# Patient Record
Sex: FEMALE | NOT HISPANIC OR LATINO | Employment: OTHER | URBAN - METROPOLITAN AREA
[De-identification: names, ages, dates, MRNs, and addresses within clinical notes are randomized per-mention and may not be internally consistent; named-entity substitution may affect disease eponyms.]

---

## 2018-06-22 ENCOUNTER — TELEPHONE (OUTPATIENT)
Dept: HEMATOLOGY/ONCOLOGY | Facility: CLINIC | Age: 68
End: 2018-06-22

## 2018-06-22 NOTE — TELEPHONE ENCOUNTER
From: Sri Junior   Sent: Friday, June 22, 2018 4:42 PM  To: '[email or web address]' <[email or web address]>  Subject: [facility]    It was good talking to you, Ms. Mcmanus!  As promised, Im sending you  our Release of Information so we can request medical records.  Please sign it and fax or mail back to me (see below contact info).    Heres what I am looking for:  - Cancer pathology/ biopsy reports  - Scans like PET scan, CT scan, MRI, ultrasound  - Treatment data (chemo, surgery report)  - Doctors notes or progress report (both cancer & anemia)    Do not hesitate to contact me.  -  Sri Junior, RN   Oncology Nurse Navigator  [place] [address]  [address]  [email or web address]  [phone] (phone)  [phone] (fax)      ===============================================================  Received referral to Hematologist for refractory anemia.  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  Was dx'd with adenocarcinoma of lung s/p chemo & lobectomy 2016.  Referral made for pulmo but not onco.  Labs 6/21/18 rbc= 3.72 and  H/h 11.7 / 34.9    Left [facility] with pt to return call.  Will ask about records.    ========================================  ----- Message from Leora Dawn sent at 6/22/2018  9:05 AM CDT -----  Regarding: Ext Referral  Radha Angela from [facility] is referring pt to Hem Onc for Anemia. Referral and records are in . Please review and call pt to schedule consult. Thank you!

## 2018-07-19 ENCOUNTER — OFFICE VISIT (OUTPATIENT)
Dept: NEPHROLOGY | Facility: CLINIC | Age: 68
End: 2018-07-19
Payer: MEDICARE

## 2018-07-19 ENCOUNTER — INFUSION (OUTPATIENT)
Dept: INFUSION THERAPY | Facility: HOSPITAL | Age: 68
End: 2018-07-19
Attending: INTERNAL MEDICINE
Payer: MEDICARE

## 2018-07-19 VITALS
OXYGEN SATURATION: 98 % | SYSTOLIC BLOOD PRESSURE: 140 MMHG | WEIGHT: 154 LBS | BODY MASS INDEX: 26.29 KG/M2 | DIASTOLIC BLOOD PRESSURE: 80 MMHG | HEART RATE: 82 BPM | HEIGHT: 64 IN

## 2018-07-19 DIAGNOSIS — N18.30 CKD (CHRONIC KIDNEY DISEASE) STAGE 3, GFR 30-59 ML/MIN: ICD-10-CM

## 2018-07-19 DIAGNOSIS — R94.4 RENAL FUNCTION TEST ABNORMAL: ICD-10-CM

## 2018-07-19 DIAGNOSIS — C34.11 MALIGNANT NEOPLASM OF UPPER LOBE OF RIGHT LUNG: ICD-10-CM

## 2018-07-19 PROBLEM — F32.A DEPRESSION: Status: ACTIVE | Noted: 2018-07-19

## 2018-07-19 LAB
ALBUMIN SERPL BCP-MCNC: 4.1 G/DL
ANION GAP SERPL CALC-SCNC: 9 MMOL/L
BASOPHILS # BLD AUTO: 0.06 K/UL
BASOPHILS NFR BLD: 1.1 %
BUN SERPL-MCNC: 20 MG/DL
CALCIUM SERPL-MCNC: 9.5 MG/DL
CHLORIDE SERPL-SCNC: 107 MMOL/L
CO2 SERPL-SCNC: 25 MMOL/L
CREAT SERPL-MCNC: 1.1 MG/DL
DIFFERENTIAL METHOD: ABNORMAL
EOSINOPHIL # BLD AUTO: 0.1 K/UL
EOSINOPHIL NFR BLD: 2.5 %
ERYTHROCYTE [DISTWIDTH] IN BLOOD BY AUTOMATED COUNT: 12.3 %
EST. GFR  (AFRICAN AMERICAN): >60 ML/MIN/1.73 M^2
EST. GFR  (NON AFRICAN AMERICAN): 52.1 ML/MIN/1.73 M^2
FERRITIN SERPL-MCNC: 41 NG/ML
GLUCOSE SERPL-MCNC: 83 MG/DL
HCT VFR BLD AUTO: 31.6 %
HGB BLD-MCNC: 10.2 G/DL
IMM GRANULOCYTES # BLD AUTO: 0.01 K/UL
IMM GRANULOCYTES NFR BLD AUTO: 0.2 %
IRON SERPL-MCNC: 79 UG/DL
LYMPHOCYTES # BLD AUTO: 1.8 K/UL
LYMPHOCYTES NFR BLD: 31.5 %
MCH RBC QN AUTO: 31.2 PG
MCHC RBC AUTO-ENTMCNC: 32.3 G/DL
MCV RBC AUTO: 97 FL
MONOCYTES # BLD AUTO: 0.4 K/UL
MONOCYTES NFR BLD: 6.3 %
NEUTROPHILS # BLD AUTO: 3.3 K/UL
NEUTROPHILS NFR BLD: 58.4 %
NRBC BLD-RTO: 0 /100 WBC
PHOSPHATE SERPL-MCNC: 4.4 MG/DL
PLATELET # BLD AUTO: 178 K/UL
PMV BLD AUTO: 8.9 FL
POTASSIUM SERPL-SCNC: 4.2 MMOL/L
RBC # BLD AUTO: 3.27 M/UL
SATURATED IRON: 23 %
SODIUM SERPL-SCNC: 141 MMOL/L
TOTAL IRON BINDING CAPACITY: 340 UG/DL
TRANSFERRIN SERPL-MCNC: 230 MG/DL
WBC # BLD AUTO: 5.56 K/UL

## 2018-07-19 PROCEDURE — 83540 ASSAY OF IRON: CPT

## 2018-07-19 PROCEDURE — 25000003 PHARM REV CODE 250: Performed by: INTERNAL MEDICINE

## 2018-07-19 PROCEDURE — A4216 STERILE WATER/SALINE, 10 ML: HCPCS | Performed by: INTERNAL MEDICINE

## 2018-07-19 PROCEDURE — 99203 OFFICE O/P NEW LOW 30 MIN: CPT | Mod: S$GLB,,, | Performed by: INTERNAL MEDICINE

## 2018-07-19 PROCEDURE — 85025 COMPLETE CBC W/AUTO DIFF WBC: CPT

## 2018-07-19 PROCEDURE — 36591 DRAW BLOOD OFF VENOUS DEVICE: CPT

## 2018-07-19 PROCEDURE — 99999 PR PBB SHADOW E&M-EST. PATIENT-LVL III: CPT | Mod: PBBFAC,,, | Performed by: INTERNAL MEDICINE

## 2018-07-19 PROCEDURE — 36415 COLL VENOUS BLD VENIPUNCTURE: CPT

## 2018-07-19 PROCEDURE — 82728 ASSAY OF FERRITIN: CPT

## 2018-07-19 PROCEDURE — 80069 RENAL FUNCTION PANEL: CPT

## 2018-07-19 PROCEDURE — 63600175 PHARM REV CODE 636 W HCPCS: Performed by: INTERNAL MEDICINE

## 2018-07-19 RX ORDER — SODIUM CHLORIDE 0.9 % (FLUSH) 0.9 %
10 SYRINGE (ML) INJECTION
Status: DISCONTINUED | OUTPATIENT
Start: 2018-07-19 | End: 2018-07-19 | Stop reason: HOSPADM

## 2018-07-19 RX ORDER — NORTRIPTYLINE HYDROCHLORIDE 50 MG/1
50 CAPSULE ORAL NIGHTLY
COMMUNITY
End: 2019-01-14

## 2018-07-19 RX ORDER — DULOXETIN HYDROCHLORIDE 30 MG/1
30 CAPSULE, DELAYED RELEASE ORAL DAILY
Refills: 2 | COMMUNITY
Start: 2018-06-23 | End: 2019-01-14 | Stop reason: SDUPTHER

## 2018-07-19 RX ORDER — HEPARIN 100 UNIT/ML
500 SYRINGE INTRAVENOUS
Status: COMPLETED | OUTPATIENT
Start: 2018-07-19 | End: 2018-07-19

## 2018-07-19 RX ADMIN — HEPARIN 500 UNITS: 100 SYRINGE at 04:07

## 2018-07-19 RX ADMIN — SODIUM CHLORIDE, PRESERVATIVE FREE 10 ML: 5 INJECTION INTRAVENOUS at 04:07

## 2018-07-19 NOTE — PROGRESS NOTES
Subjective:       Patient ID: Marietta Rosales is a 68 y.o. Unknown female who presents for new evaluation of renal funciton  HPI    and Progress note from which where [id number] yo wf with osteoarthritis, depression, IBS, r upper lobectomy for adneocarcinoma of R lung last treated 8 months ago in [place] with L subclavian prot that has not been flushed for a prolong time, anemia that had been treated with procrit in past. Patient does not know more about her medical history.There is a single document  With labs from 6/2018 in media with serum crt 1.24 gfr 53 normal electrolytesation is taken  The patient has no symptoms of fatigue, shortness of breath, chest pain, peripheral edema, flank pain, dysuria, hematuria, foamy urine, orange colored urine, nephrolithiasis,  diarrhea, constipation, lower extremity leg cramping, headache, nausea and vomiting, or weakness.    SH:former smoker  FH: noncontributory    Review of Systems   Constitutional: Negative for appetite change, chills, fatigue, fever and unexpected weight change.   HENT: Negative for congestion, facial swelling, hearing loss, nosebleeds and trouble swallowing.    Eyes: Negative for pain, discharge, redness and visual disturbance.   Respiratory: Negative for cough, chest tightness, shortness of breath and wheezing.    Cardiovascular: Negative for chest pain, palpitations and leg swelling.   Gastrointestinal: Negative for abdominal pain, constipation, diarrhea, nausea and vomiting.   Endocrine: Negative for cold intolerance, heat intolerance and polydipsia.   Genitourinary: Negative for decreased urine volume, difficulty urinating, dysuria, flank pain, hematuria and urgency.   Musculoskeletal: Negative for arthralgias, back pain, joint swelling and myalgias.   Skin: Negative for color change, pallor, rash and wound.   Neurological: Negative for dizziness, tremors, seizures, syncope, speech difficulty, weakness and headaches.   Hematological: Negative for  "adenopathy. Does not bruise/bleed easily.   Psychiatric/Behavioral: Negative for agitation, behavioral problems, dysphoric mood, self-injury and sleep disturbance.       Objective:     Blood pressure (!) 140/80, pulse 82, height 5' 4" (1.626 m), weight 69.9 kg (154 lb), SpO2 98 %.    Physical Exam   Constitutional: She is oriented to person, place, and time. She appears well-developed and well-nourished. No distress.   HENT:   Head: Normocephalic and atraumatic.   Mouth/Throat: No oropharyngeal exudate.   Eyes: Conjunctivae and EOM are normal. Pupils are equal, round, and reactive to light. Right eye exhibits no discharge. Left eye exhibits no discharge. No scleral icterus.   Neck: Normal range of motion. Neck supple. No JVD present. No tracheal deviation present. No thyromegaly present.   Cardiovascular: Normal rate, regular rhythm, normal heart sounds and intact distal pulses.  Exam reveals no gallop.    No murmur heard.  No periheral edema , dp pulses not well palpated   Pulmonary/Chest: Effort normal and breath sounds normal. No stridor. No respiratory distress. She has no wheezes. She has no rales. She exhibits no tenderness.   l subclavian port area clean dry no erythema   Abdominal: Soft. Bowel sounds are normal. She exhibits no distension and no mass. There is no tenderness. There is no rebound and no guarding. No hernia.   Musculoskeletal: She exhibits no edema or tenderness.   Lymphadenopathy:     She has no cervical adenopathy.   Neurological: She is alert and oriented to person, place, and time. She exhibits normal muscle tone.   Skin: Skin is warm and dry. No rash noted. She is not diaphoretic. No erythema.   Psychiatric: She has a normal mood and affect. Judgment and thought content normal.   Nursing note and vitals reviewed.      Assessment:       1. Renal function test abnormal    2. Malignant neoplasm of upper lobe of right lung        Plan:     69 yo wf with osteoarthritis, depression, IBS, r upper " lobectomy for adneocarcinoma of R lung last treated 8 months ago in [place] with L subclavian prot that has not been flushed for a prolong time, anemia that had been treated with procrit in past. Patient does not know more about her medical history.There is a single document  With labs from 6/2018 in media with serum crt 1.24 gfr 53 normal electrolytes    Probable ckd 3 etiology unclear at this time.   Obtain labs  for confirmation then consider further imaging and serologies  and urine studies as indicated.   RTC  In 3-4 months    Attempt to have port flushed at infusion center  Hem/on and pulmonary referrals in place as well.

## 2018-07-19 NOTE — PATIENT INSTRUCTIONS
Labs today     port flush asap     hematology /oncology referral asap    Reschedule nephrology visit  With any nephrology opening in September

## 2018-07-19 NOTE — NURSING
Pt arrived for labs from port from [facility].  Port flushes easily with good blood return, labs sent.  Port de accessed.  Pt discharged to home with armani

## 2018-07-19 NOTE — LETTER
July 19, 2018      NICOLE Villarreal  [address] [facility] [place] [address]           [facility] - Nephrology  [address]  [facility] [place] [address]  Phone: [phone]  Fax: [phone]          Patient: Marietta Rosales   MR Number: [id number]   YOB: 1950   Date of Visit: 7/19/2018       Dear Radha Angela:    Thank you for referring Marietta Rosales to me for evaluation. Attached you will find relevant portions of my assessment and plan of care.    If you have questions, please do not hesitate to call me. I look forward to following Marietta Rosales along with you.    Sincerely,    Radha De Leon MD    Enclosure  CC:  No Recipients    If you would like to receive this communication electronically, please contact [email or web address] or [phone] to request more information on [employer] Link access.    For providers and/or their staff who would like to refer a patient to [facility], please contact us through our one-stop-shop provider referral line, [facility], at [phone].    If you feel you have received this communication in error or would no longer like to receive these types of communications, please e-mail [email or web address]

## 2018-07-20 RX ORDER — SODIUM CHLORIDE 0.9 % (FLUSH) 0.9 %
10 SYRINGE (ML) INJECTION
Status: CANCELLED | OUTPATIENT
Start: 2018-07-20

## 2018-07-20 RX ORDER — HEPARIN 100 UNIT/ML
500 SYRINGE INTRAVENOUS
Status: CANCELLED | OUTPATIENT
Start: 2018-07-20

## 2018-08-15 DIAGNOSIS — C34.90 MALIGNANT NEOPLASM OF LUNG, UNSPECIFIED LATERALITY, UNSPECIFIED PART OF LUNG: Primary | ICD-10-CM

## 2018-08-16 ENCOUNTER — OFFICE VISIT (OUTPATIENT)
Dept: PULMONOLOGY | Facility: CLINIC | Age: 68
End: 2018-08-16
Payer: MEDICARE

## 2018-08-16 ENCOUNTER — HOSPITAL ENCOUNTER (OUTPATIENT)
Dept: RADIOLOGY | Facility: HOSPITAL | Age: 68
Discharge: HOME | End: 2018-08-16
Attending: INTERNAL MEDICINE
Payer: MEDICARE

## 2018-08-16 VITALS
HEIGHT: 64 IN | SYSTOLIC BLOOD PRESSURE: 118 MMHG | OXYGEN SATURATION: 98 % | WEIGHT: 158.06 LBS | DIASTOLIC BLOOD PRESSURE: 66 MMHG | BODY MASS INDEX: 26.98 KG/M2 | HEART RATE: 75 BPM

## 2018-08-16 DIAGNOSIS — C34.90 MALIGNANT NEOPLASM OF LUNG, UNSPECIFIED LATERALITY, UNSPECIFIED PART OF LUNG: ICD-10-CM

## 2018-08-16 DIAGNOSIS — C34.91 BRONCHOGENIC CARCINOMA OF RIGHT LUNG: Primary | ICD-10-CM

## 2018-08-16 PROCEDURE — 99999 PR PBB SHADOW E&M-EST. PATIENT-LVL III: CPT | Mod: PBBFAC,,, | Performed by: INTERNAL MEDICINE

## 2018-08-16 PROCEDURE — 71046 X-RAY EXAM CHEST 2 VIEWS: CPT | Mod: TC,FY

## 2018-08-16 PROCEDURE — 71046 X-RAY EXAM CHEST 2 VIEWS: CPT | Mod: 26,,, | Performed by: RADIOLOGY

## 2018-08-18 NOTE — PROGRESS NOTES
Subjective:       Patient ID: Marietta Rosales is a 68 y.o. female.    Chief Complaint: Cancer (lung) and Shortness of Breath    HPI      No flowsheet data found.]  Review of Systems    Objective:      Physical Exam        Assessment:       No diagnosis found.    Outpatient Encounter Medications as of 8/16/2018   Medication Sig Dispense Refill    DULoxetine (CYMBALTA) 30 MG capsule   2    nortriptyline (PAMELOR) 50 MG capsule Take 50 mg by mouth every evening.       No facility-administered encounter medications on file as of 8/16/2018.      No orders of the defined types were placed in this encounter.    Plan:       ***

## 2018-09-04 ENCOUNTER — HOSPITAL ENCOUNTER (EMERGENCY)
Facility: HOSPITAL | Age: 68
Discharge: HOME | End: 2018-09-04
Attending: EMERGENCY MEDICINE
Payer: MEDICARE

## 2018-09-04 VITALS
TEMPERATURE: 98 F | HEART RATE: 68 BPM | OXYGEN SATURATION: 100 % | RESPIRATION RATE: 16 BRPM | BODY MASS INDEX: 26.46 KG/M2 | DIASTOLIC BLOOD PRESSURE: 69 MMHG | WEIGHT: 155 LBS | SYSTOLIC BLOOD PRESSURE: 154 MMHG | HEIGHT: 64 IN

## 2018-09-04 DIAGNOSIS — R07.9 CHEST PAIN: ICD-10-CM

## 2018-09-04 DIAGNOSIS — K21.9 GASTROESOPHAGEAL REFLUX DISEASE, ESOPHAGITIS PRESENCE NOT SPECIFIED: Primary | ICD-10-CM

## 2018-09-04 LAB
ALBUMIN SERPL BCP-MCNC: 4.2 G/DL
ALP SERPL-CCNC: 78 U/L
ALT SERPL W/O P-5'-P-CCNC: 10 U/L
ANION GAP SERPL CALC-SCNC: 8 MMOL/L
AST SERPL-CCNC: 21 U/L
BASOPHILS # BLD AUTO: 0.06 K/UL
BASOPHILS NFR BLD: 1.2 %
BILIRUB SERPL-MCNC: 0.5 MG/DL
BNP SERPL-MCNC: 45 PG/ML
BUN SERPL-MCNC: 16 MG/DL
CALCIUM SERPL-MCNC: 10 MG/DL
CHLORIDE SERPL-SCNC: 106 MMOL/L
CO2 SERPL-SCNC: 27 MMOL/L
CREAT SERPL-MCNC: 1.2 MG/DL
DIFFERENTIAL METHOD: ABNORMAL
EOSINOPHIL # BLD AUTO: 0.1 K/UL
EOSINOPHIL NFR BLD: 2.5 %
ERYTHROCYTE [DISTWIDTH] IN BLOOD BY AUTOMATED COUNT: 12.3 %
EST. GFR  (AFRICAN AMERICAN): 54 ML/MIN/1.73 M^2
EST. GFR  (NON AFRICAN AMERICAN): 46.9 ML/MIN/1.73 M^2
GLUCOSE SERPL-MCNC: 93 MG/DL
HCT VFR BLD AUTO: 35.3 %
HGB BLD-MCNC: 11.3 G/DL
IMM GRANULOCYTES # BLD AUTO: 0.01 K/UL
IMM GRANULOCYTES NFR BLD AUTO: 0.2 %
LYMPHOCYTES # BLD AUTO: 1.7 K/UL
LYMPHOCYTES NFR BLD: 32.8 %
MCH RBC QN AUTO: 30.9 PG
MCHC RBC AUTO-ENTMCNC: 32 G/DL
MCV RBC AUTO: 96 FL
MONOCYTES # BLD AUTO: 0.4 K/UL
MONOCYTES NFR BLD: 7 %
NEUTROPHILS # BLD AUTO: 2.9 K/UL
NEUTROPHILS NFR BLD: 56.3 %
NRBC BLD-RTO: 0 /100 WBC
PLATELET # BLD AUTO: 193 K/UL
PMV BLD AUTO: 9.2 FL
POTASSIUM SERPL-SCNC: 4.7 MMOL/L
PROT SERPL-MCNC: 7.1 G/DL
RBC # BLD AUTO: 3.66 M/UL
SODIUM SERPL-SCNC: 141 MMOL/L
TROPONIN I SERPL DL<=0.01 NG/ML-MCNC: <0.006 NG/ML
WBC # BLD AUTO: 5.12 K/UL

## 2018-09-04 PROCEDURE — 90715 TDAP VACCINE 7 YRS/> IM: CPT | Performed by: NURSE PRACTITIONER

## 2018-09-04 PROCEDURE — 80053 COMPREHEN METABOLIC PANEL: CPT

## 2018-09-04 PROCEDURE — 99284 EMERGENCY DEPT VISIT MOD MDM: CPT | Mod: 25

## 2018-09-04 PROCEDURE — 85025 COMPLETE CBC W/AUTO DIFF WBC: CPT

## 2018-09-04 PROCEDURE — 90471 IMMUNIZATION ADMIN: CPT | Performed by: NURSE PRACTITIONER

## 2018-09-04 PROCEDURE — 99284 EMERGENCY DEPT VISIT MOD MDM: CPT | Mod: ,,, | Performed by: NURSE PRACTITIONER

## 2018-09-04 PROCEDURE — 25000003 PHARM REV CODE 250: Performed by: NURSE PRACTITIONER

## 2018-09-04 PROCEDURE — 93005 ELECTROCARDIOGRAM TRACING: CPT

## 2018-09-04 PROCEDURE — 84484 ASSAY OF TROPONIN QUANT: CPT

## 2018-09-04 PROCEDURE — 63600175 PHARM REV CODE 636 W HCPCS: Performed by: NURSE PRACTITIONER

## 2018-09-04 PROCEDURE — 93010 ELECTROCARDIOGRAM REPORT: CPT | Mod: ,,, | Performed by: INTERNAL MEDICINE

## 2018-09-04 PROCEDURE — 83880 ASSAY OF NATRIURETIC PEPTIDE: CPT

## 2018-09-04 RX ORDER — PANTOPRAZOLE SODIUM 20 MG/1
20 TABLET, DELAYED RELEASE ORAL DAILY
Qty: 30 TABLET | Refills: 11 | Status: SHIPPED | OUTPATIENT
Start: 2018-09-04 | End: 2019-12-10

## 2018-09-04 RX ORDER — MECLIZINE HYDROCHLORIDE 25 MG/1
25 TABLET ORAL 3 TIMES DAILY PRN
Qty: 20 TABLET | Refills: 0 | Status: SHIPPED | OUTPATIENT
Start: 2018-09-04 | End: 2018-11-02 | Stop reason: SDUPTHER

## 2018-09-04 RX ADMIN — CLOSTRIDIUM TETANI TOXOID ANTIGEN (FORMALDEHYDE INACTIVATED), CORYNEBACTERIUM DIPHTHERIAE TOXOID ANTIGEN (FORMALDEHYDE INACTIVATED), BORDETELLA PERTUSSIS TOXOID ANTIGEN (GLUTARALDEHYDE INACTIVATED), BORDETELLA PERTUSSIS FILAMENTOUS HEMAGGLUTININ ANTIGEN (FORMALDEHYDE INACTIVATED), BORDETELLA PERTUSSIS PERTACTIN ANTIGEN, AND BORDETELLA PERTUSSIS FIMBRIAE 2/3 ANTIGEN 0.5 ML: 5; 2; 2.5; 5; 3; 5 INJECTION, SUSPENSION INTRAMUSCULAR at 01:09

## 2018-09-04 RX ADMIN — ALUMINUM HYDROXIDE, MAGNESIUM HYDROXIDE, AND SIMETHICONE 50 ML: 200; 200; 20 SUSPENSION ORAL at 03:09

## 2018-09-04 NOTE — ED PROVIDER NOTES
Encounter Date: 2018       History     Chief Complaint   Patient presents with    Chest Pain     chest pain, jaw tightening, palpitaions last night. relieved at this time       Patient is a 68-year-old female with medical history of lung cancer, and anemia presenting to the ED for upper epigastric abdominal pain radiating to her chest since last night.  Patient states pain worse when lying down.  Patient also states she has been falling quite a bit lately.   Patient currently attempting to set up care with Heme-Onc since moving from [facility] and patient denies any fever, chills, nausea, vomiting or diarrhea.          Review of patient's allergies indicates:  No Known Allergies  Past Medical History:   Diagnosis Date    Anemia     Cancer     Lung cancer      Past Surgical History:   Procedure Laterality Date    LUNG REMOVAL, PARTIAL      FL REMOVAL OF LUNG,LOBECTOMY Right     Lobectomy     Family History   Problem Relation Age of Onset    Alcohol abuse Mother     Hypertension Mother     Alcohol abuse Father     Asthma Sister     Alcohol abuse Brother      Social History     Tobacco Use    Smoking status: Former Smoker     Packs/day: 2.00     Years: 31.00     Pack years: 62.00     Types: Cigarettes     Last attempt to quit: 1988     Years since quittin.0    Smokeless tobacco: Never Used   Substance Use Topics    Alcohol use: No     Frequency: Never    Drug use: No     Review of Systems   Constitutional: Negative for chills and fever.   HENT: Negative for sore throat.    Respiratory: Negative for shortness of breath.    Cardiovascular: Negative for chest pain.   Gastrointestinal: Negative for abdominal pain and nausea.   Genitourinary: Negative for dysuria.   Musculoskeletal: Negative for arthralgias, back pain, gait problem and joint swelling.   Skin: Positive for wound ( multiple abrasions). Negative for rash.   Neurological: Positive for dizziness ( intermittent). Negative for  syncope, weakness, light-headedness and numbness.   Hematological: Does not bruise/bleed easily.       Physical Exam     Initial Vitals [09/04/18 1224]   BP Pulse Resp Temp SpO2   107/74 82 18 97.5 °F (36.4 °C) 99 %      MAP       --         Physical Exam    Nursing note and vitals reviewed.  Constitutional: Vital signs are normal. She appears well-developed and well-nourished. She is cooperative. She is easily aroused. She does not have a sickly appearance. She does not appear ill.   HENT:   Head: Normocephalic and atraumatic.   Eyes: Conjunctivae, EOM and lids are normal. Pupils are equal, round, and reactive to light. Right eye exhibits no nystagmus. Left eye exhibits no nystagmus.   Neck: Trachea normal, normal range of motion, full passive range of motion without pain and phonation normal. Neck supple. No JVD present.   Cardiovascular: Normal rate, regular rhythm, normal heart sounds and intact distal pulses.   Pulses:       Radial pulses are 2+ on the right side, and 2+ on the left side.   Pulmonary/Chest: Effort normal and breath sounds normal.   Abdominal: Soft. Normal appearance and bowel sounds are normal. There is tenderness in the epigastric area. There is no rigidity, no rebound and no guarding.   Musculoskeletal: Normal range of motion.     Multiple small abrasions noted  On right upper extremity.  Patient states she has had multiple recent falls.   Neurological: She is alert, oriented to person, place, and time and easily aroused. She has normal strength. No cranial nerve deficit or sensory deficit. GCS eye subscore is 4. GCS verbal subscore is 5. GCS motor subscore is 6.   Skin: Skin is warm, dry and intact. Capillary refill takes less than 2 seconds. No rash noted. No cyanosis. Nails show no clubbing.   Psychiatric: She has a normal mood and affect. Her speech is normal and behavior is normal. Judgment and thought content normal. Cognition and memory are normal.         ED Course   Procedures  Labs  Reviewed   CBC W/ AUTO DIFFERENTIAL - Abnormal; Notable for the following components:       Result Value    RBC 3.66 (*)     Hemoglobin 11.3 (*)     Hematocrit 35.3 (*)     All other components within normal limits    Narrative:     ONE LAVENDER SHARED   COMPREHENSIVE METABOLIC PANEL - Abnormal; Notable for the following components:    eGFR if  54.0 (*)     eGFR if non  46.9 (*)     All other components within normal limits    Narrative:     ONE LAVENDER SHARED   TROPONIN I    Narrative:     ONE LAVENDER SHARED   B-TYPE NATRIURETIC PEPTIDE    Narrative:     ONE LAVENDER SHARED          Imaging Results          CT Head Without Contrast (Final result)  Result time 09/04/18 15:01:15    Final result by Arley Motta MD (09/04/18 15:01:15)                 Impression:      No evidence of acute intracranial pathology.      Electronically signed by: Arley Motta MD  Date:    09/04/2018  Time:    15:01             Narrative:    EXAMINATION:  CT HEAD WITHOUT CONTRAST    CLINICAL HISTORY:  Syncope/fainting;    TECHNIQUE:  Low dose axial CT images obtained throughout the head without the use of intravenous contrast.  Axial, sagittal and coronal reconstructions were performed.    COMPARISON:  None.    FINDINGS:  Intracranial compartment:    Ventricles and sulci are normal in size for age without evidence of hydrocephalus.    The brain parenchyma appears within normal limits.  No parenchymal mass, hemorrhage, edema or major vascular distribution infarct.    No extra-axial blood or fluid collections.    Skull/extracranial contents (limited evaluation):    No fracture. Mastoid air cells and paranasal sinuses are essentially clear.                               X-Ray Chest AP Portable (Final result)  Result time 09/04/18 14:05:39    Final result by Familia Russell MD (09/04/18 14:05:39)                 Impression:      No significant intrathoracic abnormality referable to the current history of  chest pain is appreciated.  Allowing for differences in projection, no significant detrimental interval change in the appearance of the chest since 08/16/2018 is observed.      Electronically signed by: Familia Russell MD  Date:    09/04/2018  Time:    14:05             Narrative:    EXAMINATION:  XR CHEST AP PORTABLE    CLINICAL HISTORY:  Chest Pain;    COMPARISON:  Comparison is made to 08/16/2018.    FINDINGS:  Vascular catheter again noted, its tip in the superior vena cava.  Heart size is normal, as is the appearance of the pulmonary vascularity.  A row of staples is again noted at the right pulmonary apex medially, with the lung zones appearing clear and free of significant airspace consolidation or volume loss.  No pleural fluid.  No abnormal mediastinal widening.  No pneumothorax.                                       APC / Resident Notes:   Emergent evaluation of a 67 yo female patient presenting to the ER with chief complaint of upper epigastric abdominal pain radiating to her chest and intermittent dizziness over the past 3 years.  Patient states  She has had multiple falls lately causing lacerations to right upper extremity.  On exam patient A&O x3.  Pupils equal round reactive 3-2 mm.  Abdomen soft and nontender.  Mild upper epigastric pain noted.  EKG shows normal sinus rhythm.  Will get labs, hydrate the, give tetanus, CT head and reassess.  Differential diagnoses include but are not limited to PUD, gastritis, gastroesophageal reflux, acute cholecystitis, biliary colic, pancreatitis, hepatitis, transverse colitis, or an atypical presentation of cardiac ischemia.  I discussed the care of this patient with my Supervising Physician.      Patient's CBC and CMP unremarkable. Troponin and BNP negative.CT head negative.  EKG shows NSR.  Pt states feeling much better after GI cocktail.      Patient is hemodynamically stable, vital signs are normal. Discharge instructions given. Prescription for Protonix and  Meclazine given and explained. Return to ED precautions discussed. Follow up as directed. Pt verbalized understanding of this plan. Pt is stable for discharge.                    Clinical Impression:   The primary encounter diagnosis was Gastroesophageal reflux disease, esophagitis presence not specified. A diagnosis of Chest pain was also pertinent to this visit.      Disposition:   Disposition: Discharged  Condition: Stable                        Christie Mosqueda NP  09/04/18 8630

## 2018-09-04 NOTE — PROVIDER PROGRESS NOTES - EMERGENCY DEPT.
Encounter Date: 9/4/2018    ED Physician Progress Notes         EKG - STEMI Decision  Initial Reading: No STEMI present.

## 2018-09-04 NOTE — ED TRIAGE NOTES
"Patient states she has been falling down "a lot" due to balance issue. States she had "indigestion" last night. Pain to mid upper abdomen. Also concerned about bruise/ abrasion to outer RFA after fall Saturday.   "

## 2018-09-04 NOTE — ED NOTES
Patient identifiers verified and correct for MS Rosales  C/C: Wound check to RFA, mid upper epigastric pain with indigestion last night  APPEARANCE: awake and alert in NAD. Color pale.   SKIN: warm, dry and intact. No breakdown or bruising.  MUSCULOSKELETAL: Patient moving all extremities spontaneously, no obvious swelling or deformities noted. Ambulates independently.  RESPIRATORY: Denies shortness of breath.Respirations unlabored. Denies cough  CARDIAC: Positive CP, 2+ distal pulses; no peripheral edema  ABDOMEN: ABdomen  Soft,mid sternal upper mid epigastric pain ,   : voids spontaneously, denies difficulty  Neurologic: AAO x 4; follows commands equal strength in all extremities; denies numbness/tingling. Positive dizziness, positive weakness

## 2018-09-05 ENCOUNTER — PES CALL (OUTPATIENT)
Dept: ADMINISTRATIVE | Facility: CLINIC | Age: 68
End: 2018-09-05

## 2018-10-03 ENCOUNTER — TELEPHONE (OUTPATIENT)
Dept: NEPHROLOGY | Facility: CLINIC | Age: 68
End: 2018-10-03

## 2018-10-04 ENCOUNTER — HOSPITAL ENCOUNTER (EMERGENCY)
Facility: HOSPITAL | Age: 68
Discharge: HOME | End: 2018-10-04
Attending: EMERGENCY MEDICINE
Payer: MEDICARE

## 2018-10-04 VITALS
BODY MASS INDEX: 25.66 KG/M2 | HEIGHT: 65 IN | SYSTOLIC BLOOD PRESSURE: 111 MMHG | RESPIRATION RATE: 18 BRPM | WEIGHT: 154 LBS | OXYGEN SATURATION: 99 % | DIASTOLIC BLOOD PRESSURE: 58 MMHG | TEMPERATURE: 99 F | HEART RATE: 78 BPM

## 2018-10-04 DIAGNOSIS — N30.01 ACUTE CYSTITIS WITH HEMATURIA: Primary | ICD-10-CM

## 2018-10-04 LAB
ALBUMIN SERPL BCP-MCNC: 4.1 G/DL
ALP SERPL-CCNC: 76 U/L
ALT SERPL W/O P-5'-P-CCNC: 11 U/L
ANION GAP SERPL CALC-SCNC: 8 MMOL/L
AST SERPL-CCNC: 23 U/L
BACTERIA #/AREA URNS AUTO: ABNORMAL /HPF
BASOPHILS # BLD AUTO: 0.05 K/UL
BASOPHILS NFR BLD: 0.5 %
BILIRUB SERPL-MCNC: 0.4 MG/DL
BILIRUB UR QL STRIP: NEGATIVE
BUN SERPL-MCNC: 18 MG/DL
BUN SERPL-MCNC: 19 MG/DL (ref 6–30)
CALCIUM SERPL-MCNC: 9.7 MG/DL
CHLORIDE SERPL-SCNC: 104 MMOL/L (ref 95–110)
CHLORIDE SERPL-SCNC: 105 MMOL/L
CLARITY UR REFRACT.AUTO: ABNORMAL
CO2 SERPL-SCNC: 24 MMOL/L
COLOR UR AUTO: YELLOW
CREAT SERPL-MCNC: 1.2 MG/DL
CREAT SERPL-MCNC: 1.2 MG/DL (ref 0.5–1.4)
DIFFERENTIAL METHOD: ABNORMAL
EOSINOPHIL # BLD AUTO: 0.1 K/UL
EOSINOPHIL NFR BLD: 1.1 %
ERYTHROCYTE [DISTWIDTH] IN BLOOD BY AUTOMATED COUNT: 12.6 %
EST. GFR  (AFRICAN AMERICAN): 54 ML/MIN/1.73 M^2
EST. GFR  (NON AFRICAN AMERICAN): 46.9 ML/MIN/1.73 M^2
GLUCOSE SERPL-MCNC: 103 MG/DL
GLUCOSE SERPL-MCNC: 106 MG/DL (ref 70–110)
GLUCOSE UR QL STRIP: NEGATIVE
HCT VFR BLD AUTO: 33.6 %
HCT VFR BLD CALC: 30 %PCV (ref 36–54)
HGB BLD-MCNC: 10.4 G/DL
HGB UR QL STRIP: ABNORMAL
HYALINE CASTS UR QL AUTO: 0 /LPF
IMM GRANULOCYTES # BLD AUTO: 0.02 K/UL
IMM GRANULOCYTES NFR BLD AUTO: 0.2 %
KETONES UR QL STRIP: NEGATIVE
LEUKOCYTE ESTERASE UR QL STRIP: ABNORMAL
LYMPHOCYTES # BLD AUTO: 1.2 K/UL
LYMPHOCYTES NFR BLD: 13 %
MCH RBC QN AUTO: 30.5 PG
MCHC RBC AUTO-ENTMCNC: 31 G/DL
MCV RBC AUTO: 99 FL
MICROSCOPIC COMMENT: ABNORMAL
MONOCYTES # BLD AUTO: 0.5 K/UL
MONOCYTES NFR BLD: 5.2 %
NEUTROPHILS # BLD AUTO: 7.5 K/UL
NEUTROPHILS NFR BLD: 80 %
NITRITE UR QL STRIP: NEGATIVE
NRBC BLD-RTO: 0 /100 WBC
PH UR STRIP: 7 [PH] (ref 5–8)
PLATELET # BLD AUTO: 170 K/UL
PMV BLD AUTO: 9.2 FL
POC IONIZED CALCIUM: 1.12 MMOL/L (ref 1.06–1.42)
POC TCO2 (MEASURED): 24 MMOL/L (ref 23–29)
POTASSIUM BLD-SCNC: 4.7 MMOL/L (ref 3.5–5.1)
POTASSIUM SERPL-SCNC: 4.8 MMOL/L
PROT SERPL-MCNC: 7 G/DL
PROT UR QL STRIP: ABNORMAL
RBC # BLD AUTO: 3.41 M/UL
RBC #/AREA URNS AUTO: >100 /HPF (ref 0–4)
SAMPLE: ABNORMAL
SODIUM BLD-SCNC: 138 MMOL/L (ref 136–145)
SODIUM SERPL-SCNC: 137 MMOL/L
SP GR UR STRIP: 1 (ref 1–1.03)
SQUAMOUS #/AREA URNS AUTO: 1 /HPF
URN SPEC COLLECT METH UR: ABNORMAL
UROBILINOGEN UR STRIP-ACNC: NEGATIVE EU/DL
WBC # BLD AUTO: 9.32 K/UL
WBC #/AREA URNS AUTO: >100 /HPF (ref 0–5)

## 2018-10-04 PROCEDURE — 87077 CULTURE AEROBIC IDENTIFY: CPT

## 2018-10-04 PROCEDURE — 87186 SC STD MICRODIL/AGAR DIL: CPT

## 2018-10-04 PROCEDURE — 87086 URINE CULTURE/COLONY COUNT: CPT

## 2018-10-04 PROCEDURE — 87088 URINE BACTERIA CULTURE: CPT

## 2018-10-04 PROCEDURE — 80053 COMPREHEN METABOLIC PANEL: CPT

## 2018-10-04 PROCEDURE — 85025 COMPLETE CBC W/AUTO DIFF WBC: CPT

## 2018-10-04 PROCEDURE — 25000003 PHARM REV CODE 250: Performed by: NURSE PRACTITIONER

## 2018-10-04 PROCEDURE — 99284 EMERGENCY DEPT VISIT MOD MDM: CPT | Mod: ,,, | Performed by: NURSE PRACTITIONER

## 2018-10-04 PROCEDURE — 81001 URINALYSIS AUTO W/SCOPE: CPT

## 2018-10-04 PROCEDURE — 99283 EMERGENCY DEPT VISIT LOW MDM: CPT

## 2018-10-04 RX ORDER — SULFAMETHOXAZOLE AND TRIMETHOPRIM 800; 160 MG/1; MG/1
1 TABLET ORAL
Status: COMPLETED | OUTPATIENT
Start: 2018-10-04 | End: 2018-10-04

## 2018-10-04 RX ORDER — SULFAMETHOXAZOLE AND TRIMETHOPRIM 800; 160 MG/1; MG/1
1 TABLET ORAL 2 TIMES DAILY
Qty: 14 TABLET | Refills: 0 | Status: SHIPPED | OUTPATIENT
Start: 2018-10-04 | End: 2018-10-11

## 2018-10-04 RX ADMIN — SULFAMETHOXAZOLE AND TRIMETHOPRIM 1 TABLET: 800; 160 TABLET ORAL at 01:10

## 2018-10-04 NOTE — DISCHARGE INSTRUCTIONS
Our goal in the emergency department is to always give you outstanding care and exceptional service. You may receive a survey by mail or e-mail in the next week regarding your experience in our ED. We would greatly appreciate your completing and returning the survey. Your feedback provides us with a way to recognize our staff who give very good care and it helps us learn how to improve when your experience was below our aspiration of excellence.     Please follow up with Nephrology as scheduled.  If symptoms worsen, develop back pain/fevers/vomiting return to ER for further treatment.

## 2018-10-04 NOTE — TELEPHONE ENCOUNTER
Spoke with pt pt said she will contact her pcp to see if she need to go to the ED.----- Message from Mamta Yvette sent at 10/4/2018  9:15 AM CDT -----  Contact: Marietta        [phone]   Pt.says she hurt her back and is having urinary incontinence, and wants to be seen today.   Her appt. Was cancelled for today with you and pt. Says she MUST be seen today.   Asking for a return call w/ an appt. Time to come in.

## 2018-10-04 NOTE — ED TRIAGE NOTES
Patient states fell 2-3 nights PTA, pain to left lower back. Currently with incont with no sensation and foul smell.

## 2018-10-04 NOTE — ED NOTES
Discharge instructions reviewed and pt understands to take antibiotics for 7 days.  IV removed per protocol and states no questions at this time.

## 2018-10-04 NOTE — ED NOTES
Patient identifiers verified and correct for Ms Rosales  C/C: Right back pain , fouls smelling urine  APPEARANCE: awake and alert in NAD.  SKIN: warm, dry and intact. No breakdown or bruising.  MUSCULOSKELETAL: Patient moving all extremities spontaneously, no obvious swelling or deformities noted. Ambulates independently.  RESPIRATORY: Denies shortness of breath.Respirations unlabored.   CARDIAC: Denies CP, 2+ distal pulses; no peripheral edema  ABDOMEN: S/ND/NT, Denies nausea  : voids spontaneously, states foul smell with yello color.   Neurologic: AAO x 4; follows commands equal strength in all extremities; denies numbness/tingling. Denies dizziness pain to left lower back after fall

## 2018-10-04 NOTE — ED PROVIDER NOTES
Encounter Date: 10/4/2018       History     Chief Complaint   Patient presents with    Urinary Tract Infection     fall 3 days ago. back pain, bruised. now has uti. urinary frequency and incontinence with odor.     Fall     Patient is a 68-year-old female with medical history of cancer, GERD, anemia presenting to the ED for a fall 3 days ago as well as foul-smelling urine.  Patient states she reached up to turn off the light and fell to the ground.  Patient states she has difficulty with change of lighting situations.  Patient states since that time she has had burning with urination, frequency and foul-smelling urine.  Patient states today she had 2 episodes of incontinence.  Patient denies any fever, chills, nausea, vomiting or diarrhea.          Review of patient's allergies indicates:  No Known Allergies  Past Medical History:   Diagnosis Date    Anemia     Cancer     GERD (gastroesophageal reflux disease)     Lung cancer      Past Surgical History:   Procedure Laterality Date    LUNG REMOVAL, PARTIAL      [place] REMOVAL OF LUNG,LOBECTOMY Right     Lobectomy     Family History   Problem Relation Age of Onset    Alcohol abuse Mother     Hypertension Mother     Alcohol abuse Father     Asthma Sister     Alcohol abuse Brother      Social History     Tobacco Use    Smoking status: Former Smoker     Packs/day: 2.00     Years: 31.00     Pack years: 62.00     Types: Cigarettes     Last attempt to quit: 1988     Years since quittin.1    Smokeless tobacco: Never Used   Substance Use Topics    Alcohol use: No     Frequency: Never    Drug use: No     Review of Systems   Constitutional: Negative for activity change, appetite change, chills and fever.   HENT: Negative for sore throat.    Respiratory: Negative for chest tightness and shortness of breath.    Cardiovascular: Negative for chest pain.   Gastrointestinal: Negative for abdominal pain and nausea.   Genitourinary: Positive for dysuria,  frequency, hematuria and urgency. Negative for difficulty urinating.   Musculoskeletal: Positive for back pain ( right mid back).   Skin: Positive for color change ( scattered bruising). Negative for rash.   Neurological: Negative for dizziness, syncope, weakness, numbness and headaches.   Hematological: Bruises/bleeds easily.       Physical Exam     Initial Vitals [10/04/18 1137]   BP Pulse Resp Temp SpO2   (!) 102/59 96 18 98.1 °F (36.7 °C) 100 %      MAP       --         Physical Exam    Nursing note and vitals reviewed.  Constitutional: Vital signs are normal. She appears well-developed and well-nourished. She is cooperative. She does not have a sickly appearance. She does not appear ill. No distress.   HENT:   Head: Normocephalic and atraumatic. Head is without abrasion, without contusion and without laceration.   Mouth/Throat: Uvula is midline, oropharynx is clear and moist and mucous membranes are normal.   Eyes: Conjunctivae, EOM and lids are normal. Pupils are equal, round, and reactive to light.   Pupils equal round reactive 3-2 mm.   Neck: Trachea normal, normal range of motion, full passive range of motion without pain and phonation normal. Neck supple. No spinous process tenderness and no muscular tenderness present. No JVD present.   Cardiovascular: Normal rate, regular rhythm, normal heart sounds and intact distal pulses.   Pulmonary/Chest: Effort normal and breath sounds normal.   Abdominal: Soft. Normal appearance and bowel sounds are normal. She exhibits no distension. There is no tenderness. There is no rigidity, no rebound and no guarding.   Musculoskeletal: Normal range of motion.   Neurological: She is alert and oriented to person, place, and time. She has normal strength.   Skin: Skin is warm, dry and intact. Capillary refill takes 2 to 3 seconds. Ecchymosis noted. No rash noted. No cyanosis. There is pallor. Nails show no clubbing.        Psychiatric: She has a normal mood and affect. Her  speech is normal and behavior is normal. Judgment and thought content normal. Cognition and memory are normal.         ED Course   Procedures  Labs Reviewed   URINALYSIS, REFLEX TO URINE CULTURE - Abnormal; Notable for the following components:       Result Value    Appearance, UA Hazy (*)     Protein, UA 1+ (*)     Occult Blood UA 3+ (*)     Leukocytes, UA 3+ (*)     All other components within normal limits    Narrative:     Preferred Collection Type->Urine, Clean Catch   CBC W/ AUTO DIFFERENTIAL - Abnormal; Notable for the following components:    RBC 3.41 (*)     Hemoglobin 10.4 (*)     Hematocrit 33.6 (*)     MCV 99 (*)     MCHC 31.0 (*)     Gran% 80.0 (*)     Lymph% 13.0 (*)     All other components within normal limits   COMPREHENSIVE METABOLIC PANEL - Abnormal; Notable for the following components:    eGFR if  54.0 (*)     eGFR if non  46.9 (*)     All other components within normal limits   URINALYSIS MICROSCOPIC - Abnormal; Notable for the following components:    RBC, UA >100 (*)     WBC, UA >100 (*)     All other components within normal limits    Narrative:     Preferred Collection Type->Urine, Clean Catch   ISTAT PROCEDURE - Abnormal; Notable for the following components:    POC Hematocrit 30 (*)     All other components within normal limits   CULTURE, URINE   ISTAT CHEM8          Imaging Results    None                APC / Resident Notes:   Emergent evaluation of a 67 yo female patient presenting to the ER with chief complaint of fall 3 days ago as well as urinary frequency and foul-smelling urine.  Patient states symptoms began 2 days ago.  On exam patient A&O x3. Small ecchymotic area noted to patient's right thoracic area.  Ecchymosis noted to patient's right forearm.  Patient denies any shortness of breath or chest pain.  No pain to palpation of ecchymotic areas.  Abdomen soft and nontender. Bowel sounds within normal limits. Head atraumatic. Pupils equal round  reactive 3-2 mm.  Patient denies hitting head or neck.  Patient denies loss of consciousness.  I will get labs, UA and reassess. Differential diagnoses include but are not limited to UTI, pyelonephritis, ureterolithiasis, STI, others.  I discussed the care of this patient with my Supervising Physician.      Patient's CBC unremarkable H&H stable at 10.4 and 33.6.  CMP unremarkable. UA shows hazy appearance with 1+ protein, 3+ blood and 3+ leukocytes.  Culture pending.  Will treat for UTI with Bactrim. VSS. Exam without any abdominal or CVAT. Pt is afebrile & has not been vomiting.    The patient reassessed.  Patient states feeling better. Patient is hemodynamically stable, vital signs are normal. Discharge instructions given. Prescription for Bactrim given and explained.   Return to ED precautions discussed. Follow up as directed. Pt verbalized understanding of this plan. Pt is stable for discharge.                    Clinical Impression:   The encounter diagnosis was Acute cystitis with hematuria.      Disposition:   Disposition: Discharged  Condition: Stable                        Christie Mosqueda NP  10/04/18 1341

## 2018-10-06 LAB — BACTERIA UR CULT: NORMAL

## 2018-11-02 ENCOUNTER — INITIAL CONSULT (OUTPATIENT)
Dept: HEMATOLOGY/ONCOLOGY | Facility: CLINIC | Age: 68
End: 2018-11-02
Payer: MEDICARE

## 2018-11-02 VITALS
TEMPERATURE: 98 F | BODY MASS INDEX: 26.91 KG/M2 | WEIGHT: 157.63 LBS | SYSTOLIC BLOOD PRESSURE: 131 MMHG | HEART RATE: 87 BPM | DIASTOLIC BLOOD PRESSURE: 70 MMHG | HEIGHT: 64 IN | RESPIRATION RATE: 18 BRPM

## 2018-11-02 DIAGNOSIS — C34.91: Primary | ICD-10-CM

## 2018-11-02 DIAGNOSIS — N18.30 CKD (CHRONIC KIDNEY DISEASE) STAGE 3, GFR 30-59 ML/MIN: ICD-10-CM

## 2018-11-02 DIAGNOSIS — F32.A DEPRESSION, UNSPECIFIED DEPRESSION TYPE: ICD-10-CM

## 2018-11-02 DIAGNOSIS — C34.11 MALIGNANT NEOPLASM OF UPPER LOBE OF RIGHT LUNG: ICD-10-CM

## 2018-11-02 DIAGNOSIS — R42 VERTIGO: ICD-10-CM

## 2018-11-02 PROCEDURE — 99205 OFFICE O/P NEW HI 60 MIN: CPT | Mod: S$GLB,,, | Performed by: INTERNAL MEDICINE

## 2018-11-02 PROCEDURE — 1101F PT FALLS ASSESS-DOCD LE1/YR: CPT | Mod: CPTII,S$GLB,, | Performed by: INTERNAL MEDICINE

## 2018-11-02 PROCEDURE — 99999 PR PBB SHADOW E&M-EST. PATIENT-LVL III: CPT | Mod: PBBFAC,,, | Performed by: INTERNAL MEDICINE

## 2018-11-02 RX ORDER — MECLIZINE HYDROCHLORIDE 25 MG/1
25 TABLET ORAL 3 TIMES DAILY PRN
Qty: 60 TABLET | Refills: 0 | Status: SHIPPED | OUTPATIENT
Start: 2018-11-02 | End: 2019-02-06 | Stop reason: SDUPTHER

## 2018-11-02 NOTE — PROGRESS NOTES
Hematology and Medical Oncology   New Patient Consult     11/02/2018  Referred by:  Dr. Radha De Leon    Primary Oncologic Diagnosis:Bronchogenic Carcinoma    History of Present Ilness:   Marietta Rosales (Marietta) is a pleasant 68 y.o.female who recently moved from the [place] area to [place] and is establishing care at [facility].    Ms. Rosales is an exceptionally pleasant female, who remains active, walking several miles daily. She is about to take in her niece to finish raising her. In 2015 she was diagnosed and treated for stage II adenocarcinoma of the lung.    Oncology History:  --Diagnosed with Stage II (p2T2N0) adenocarcinoma in the spring of 2015 when a right upper lobe nodule was seen on CT in January 2015 and was persistent at 0.8cm in April 2015.  --Underwent a right upper lobectomy on 5/6/15  --Completed 4 cycles of Cisplatin/Pemetrexed in early September 2105.  --All surveillance scans have been disease free      PAST MEDICAL HISTORY:   Past Medical History:   Diagnosis Date    Anemia     Cancer     GERD (gastroesophageal reflux disease)     Lung cancer        PAST SURGICAL HISTORY:   Past Surgical History:   Procedure Laterality Date    LUNG REMOVAL, PARTIAL      [place] REMOVAL OF LUNG,LOBECTOMY Right     Lobectomy       PAST SOCIAL HISTORY:   reports that she quit smoking about 30 years ago. Her smoking use included cigarettes. She has a 62.00 pack-year smoking history. she has never used smokeless tobacco. She reports that she does not drink alcohol or use drugs.    FAMILY HISTORY:  Family History   Problem Relation Age of Onset    Alcohol abuse Mother     Hypertension Mother     Alcohol abuse Father     Asthma Sister     Alcohol abuse Brother        CURRENT MEDICATIONS:   Current Outpatient Medications   Medication Sig    DULoxetine (CYMBALTA) 30 MG capsule Take 30 mg by mouth once daily.     meclizine (ANTIVERT) 25 mg tablet Take 1 tablet (25 mg total) by mouth 3 (three) times  daily as needed for Dizziness.    nortriptyline (PAMELOR) 50 MG capsule Take 50 mg by mouth every evening.    pantoprazole (PROTONIX) 20 MG tablet Take 1 tablet (20 mg total) by mouth once daily.     No current facility-administered medications for this visit.      ALLERGIES:   Review of patient's allergies indicates:  No Known Allergies      Review of Systems:     Review of Systems   Constitutional: Negative for appetite change, chills, diaphoresis, fatigue, fever and unexpected weight change.   HENT:   Negative for hearing loss, mouth sores, nosebleeds, sore throat, trouble swallowing and voice change.    Eyes: Negative for eye problems and icterus.   Respiratory: Negative for chest tightness, cough, hemoptysis, shortness of breath and wheezing.    Cardiovascular: Negative for chest pain, leg swelling and palpitations.   Gastrointestinal: Negative for abdominal distention, abdominal pain, blood in stool, diarrhea, nausea and vomiting.   Endocrine: Negative for hot flashes.   Genitourinary: Negative for bladder incontinence, difficulty urinating, dysuria and hematuria.    Musculoskeletal: Positive for arthralgias. Negative for back pain, flank pain, gait problem, myalgias, neck pain and neck stiffness.   Skin: Negative for itching, rash and wound.   Neurological: Negative for dizziness, extremity weakness, gait problem, headaches, numbness, seizures and speech difficulty.   Hematological: Negative for adenopathy. Does not bruise/bleed easily.   Psychiatric/Behavioral: Negative for confusion, depression and sleep disturbance. The patient is not nervous/anxious.           Physical Exam:     Vitals:    11/02/18 1424   BP: 131/70   Pulse: 87   Resp: 18   Temp: 97.9 °F (36.6 °C)       Physical Exam   Constitutional: She is oriented to person, place, and time. She appears well-developed and well-nourished. No distress.   HENT:   Head: Normocephalic and atraumatic.   Mouth/Throat: Oropharynx is clear and moist. No  oropharyngeal exudate.   Eyes: Conjunctivae and EOM are normal. Pupils are equal, round, and reactive to light.   Neck: Normal range of motion. Neck supple. No JVD present. No tracheal deviation present. No thyromegaly present.   Cardiovascular: Normal rate, regular rhythm and normal heart sounds. Exam reveals no friction rub.   No murmur heard.  Pulmonary/Chest: Effort normal. No stridor. No respiratory distress. She has no wheezes. She has no rales. She exhibits no tenderness.   decreased breath sounds on right   Abdominal: Soft. Bowel sounds are normal. She exhibits no distension. There is no tenderness. There is no rebound and no guarding.   Musculoskeletal: Normal range of motion. She exhibits no edema, tenderness or deformity.   Lymphadenopathy:     She has no axillary adenopathy.   Neurological: She is alert and oriented to person, place, and time. She displays normal reflexes. No cranial nerve deficit or sensory deficit. She exhibits normal muscle tone. Coordination normal.   Skin: Skin is warm and dry. Capillary refill takes less than 2 seconds. No rash noted. She is not diaphoretic. No erythema. No pallor.   Psychiatric: She has a normal mood and affect. Her behavior is normal. Judgment and thought content normal.       ECOG Performance Status: (foot note - ECOG PS provided by Eastern Cooperative Oncology Group) 0 - Asymptomatic    Karnofsky Performance Score:  90%- Able to Carry on Normal Activity: Minor Symptoms of Disease    Labs:   Lab Results   Component Value Date    WBC 9.32 10/04/2018    HGB 10.4 (L) 10/04/2018    HCT 30 (L) 10/04/2018     10/04/2018    ALT 11 10/04/2018    AST 23 10/04/2018     10/04/2018    K 4.8 10/04/2018     10/04/2018    CREATININE 1.2 10/04/2018    BUN 18 10/04/2018    CO2 24 10/04/2018         Imaging: Previous imaging has been reviewed   Assessment and Plan:     Ms. Rosales is a 68 year old female who is here for ongoing lung cancer  monitoring    Adenocarcinoma of the Right Lung  --Stage II, treated with lobectomy and Cis/Pem x 4 at [facility]  --Will get baseline PET and MRI brain for surveillance  --Currently no active signs of lung cancer    60 minutes were spent face to face with the patient to discuss the disease, natural history, treatment options and survival statistics. I have provided the patient with an opportunity to ask questions and have all questions answered to her satisfaction.       she will return to clinic following completion of imaging, but knows to call in the interim if symptoms change or should a problem arise.        Bria Colby MD  Hematology and Medical Oncology  Bone Marrow Transplant  [facility]

## 2018-11-02 NOTE — LETTER
November 5, 2018      Radha De Leon MD  [address]  [place] [id number]           [place] [facility] Hematology Oncology  [address]  [place] [id number]  Phone: [phone]          Patient: Marietta Rosales   MR Number: [id number]   YOB: 1950   Date of Visit: 11/2/2018       Dear Dr. Radha De Leon:    Thank you for referring Marietta Rosales to me for evaluation. Attached you will find relevant portions of my assessment and plan of care.    If you have questions, please do not hesitate to call me. I look forward to following Marietta Rosales along with you.    Sincerely,    Bria Colby MD    Enclosure  CC:  No Recipients    If you would like to receive this communication electronically, please contact [email or web address] or [phone] to request more information on [facility] Link access.    For providers and/or their staff who would like to refer a patient to [facility], please contact us through our one-stop-shop provider referral line, [facility] , at [phone].    If you feel you have received this communication in error or would no longer like to receive these types of communications, please e-mail [email or web address]

## 2018-11-02 NOTE — Clinical Note
1. PET and MRI brain in the next 1-2 weeks2. See me after imaging is complete at some point this month

## 2018-11-09 ENCOUNTER — HOSPITAL ENCOUNTER (OUTPATIENT)
Dept: RADIOLOGY | Facility: HOSPITAL | Age: 68
Discharge: HOME | End: 2018-11-09
Attending: INTERNAL MEDICINE
Payer: MEDICARE

## 2018-11-09 DIAGNOSIS — C34.91: ICD-10-CM

## 2018-11-09 PROCEDURE — 70553 MRI BRAIN STEM W/O & W/DYE: CPT | Mod: TC

## 2018-11-09 PROCEDURE — A9585 GADOBUTROL INJECTION: HCPCS | Performed by: INTERNAL MEDICINE

## 2018-11-09 PROCEDURE — 70553 MRI BRAIN STEM W/O & W/DYE: CPT | Mod: 26,,, | Performed by: RADIOLOGY

## 2018-11-09 PROCEDURE — 25500020 PHARM REV CODE 255: Performed by: INTERNAL MEDICINE

## 2018-11-09 RX ORDER — GADOBUTROL 604.72 MG/ML
7 INJECTION INTRAVENOUS
Status: COMPLETED | OUTPATIENT
Start: 2018-11-09 | End: 2018-11-09

## 2018-11-09 RX ADMIN — GADOBUTROL 7 ML: 604.72 INJECTION INTRAVENOUS at 04:11

## 2018-11-16 ENCOUNTER — TELEPHONE (OUTPATIENT)
Dept: HEMATOLOGY/ONCOLOGY | Facility: CLINIC | Age: 68
End: 2018-11-16

## 2018-11-16 NOTE — TELEPHONE ENCOUNTER
Patient returned my call. Dr Colby would like patient to have pet scan she stated the insurance will not cover it. Dr Colby will call the insurance to do a peer to peer to get the approval. If it does not get approved she will do a ct scan chest,abdomen,pelvis. Patient verbalized understanding.

## 2018-11-16 NOTE — TELEPHONE ENCOUNTER
Called patient about her appointment that was missed on 11/9 Pet scan. Dr Colby needs results for the appointment that is scheduled for today 11/16. Left dept number for her to call me back.

## 2019-01-14 ENCOUNTER — OFFICE VISIT (OUTPATIENT)
Dept: INTERNAL MEDICINE | Facility: CLINIC | Age: 69
End: 2019-01-14
Payer: MEDICARE

## 2019-01-14 ENCOUNTER — APPOINTMENT (OUTPATIENT)
Dept: LAB | Facility: HOSPITAL | Age: 69
End: 2019-01-14
Attending: NURSE PRACTITIONER
Payer: MEDICARE

## 2019-01-14 VITALS
WEIGHT: 161.63 LBS | BODY MASS INDEX: 27.59 KG/M2 | SYSTOLIC BLOOD PRESSURE: 120 MMHG | HEART RATE: 71 BPM | TEMPERATURE: 98 F | HEIGHT: 64 IN | OXYGEN SATURATION: 99 % | DIASTOLIC BLOOD PRESSURE: 65 MMHG

## 2019-01-14 DIAGNOSIS — F32.A DEPRESSION, UNSPECIFIED DEPRESSION TYPE: Primary | ICD-10-CM

## 2019-01-14 LAB
BILIRUB UR QL STRIP: NEGATIVE
CLARITY UR REFRACT.AUTO: CLEAR
COLOR UR AUTO: YELLOW
GLUCOSE UR QL STRIP: NEGATIVE
HGB UR QL STRIP: NEGATIVE
KETONES UR QL STRIP: NEGATIVE
LEUKOCYTE ESTERASE UR QL STRIP: NEGATIVE
NITRITE UR QL STRIP: NEGATIVE
PH UR STRIP: 6 [PH] (ref 5–8)
PROT UR QL STRIP: NEGATIVE
SP GR UR STRIP: 1 (ref 1–1.03)
URN SPEC COLLECT METH UR: NORMAL

## 2019-01-14 PROCEDURE — 1100F PR PT FALLS ASSESS DOC 2+ FALLS/FALL W/INJURY/YR: ICD-10-PCS | Mod: CPTII,S$GLB,, | Performed by: NURSE PRACTITIONER

## 2019-01-14 PROCEDURE — 1100F PTFALLS ASSESS-DOCD GE2>/YR: CPT | Mod: CPTII,S$GLB,, | Performed by: NURSE PRACTITIONER

## 2019-01-14 PROCEDURE — 99999 PR PBB SHADOW E&M-EST. PATIENT-LVL IV: CPT | Mod: PBBFAC,,, | Performed by: NURSE PRACTITIONER

## 2019-01-14 PROCEDURE — 99202 PR OFFICE/OUTPT VISIT, NEW, LEVL II, 15-29 MIN: ICD-10-PCS | Mod: S$GLB,,, | Performed by: NURSE PRACTITIONER

## 2019-01-14 PROCEDURE — 81003 URINALYSIS AUTO W/O SCOPE: CPT

## 2019-01-14 PROCEDURE — 99999 PR PBB SHADOW E&M-EST. PATIENT-LVL IV: ICD-10-PCS | Mod: PBBFAC,,, | Performed by: NURSE PRACTITIONER

## 2019-01-14 PROCEDURE — 99499 RISK ADDL DX/OHS AUDIT: ICD-10-PCS | Mod: S$GLB,,, | Performed by: NURSE PRACTITIONER

## 2019-01-14 PROCEDURE — 99499 UNLISTED E&M SERVICE: CPT | Mod: S$GLB,,, | Performed by: NURSE PRACTITIONER

## 2019-01-14 PROCEDURE — 3288F FALL RISK ASSESSMENT DOCD: CPT | Mod: CPTII,S$GLB,, | Performed by: NURSE PRACTITIONER

## 2019-01-14 PROCEDURE — 3288F PR FALLS RISK ASSESSMENT DOCUMENTED: ICD-10-PCS | Mod: CPTII,S$GLB,, | Performed by: NURSE PRACTITIONER

## 2019-01-14 PROCEDURE — 99202 OFFICE O/P NEW SF 15 MIN: CPT | Mod: S$GLB,,, | Performed by: NURSE PRACTITIONER

## 2019-01-14 RX ORDER — DULOXETIN HYDROCHLORIDE 30 MG/1
30 CAPSULE, DELAYED RELEASE ORAL DAILY
Qty: 30 CAPSULE | Refills: 0 | Status: SHIPPED | OUTPATIENT
Start: 2019-01-14 | End: 2019-02-06 | Stop reason: SDUPTHER

## 2019-01-14 RX ORDER — NORTRIPTYLINE HYDROCHLORIDE 75 MG/1
CAPSULE ORAL
Qty: 30 CAPSULE | Refills: 0 | Status: SHIPPED | OUTPATIENT
Start: 2019-01-14 | End: 2019-02-06 | Stop reason: SDUPTHER

## 2019-01-14 RX ORDER — NORTRIPTYLINE HYDROCHLORIDE 75 MG/1
CAPSULE ORAL
Refills: 2 | COMMUNITY
Start: 2018-11-21 | End: 2019-01-14 | Stop reason: SDUPTHER

## 2019-01-14 NOTE — PROGRESS NOTES
Subjective:       Patient ID: Marietta Rosales is a 69 y.o. female.    Chief Complaint: Annual Exam and Medication Refill    Disclaimer: This note has been generated using voice-recognition software. There may be typographical errors that have been missed during proof-reading  Patient is new to this clinic in his provider.  Patient states she was here about a year ago and her was seen prior at [facility].  Patient's healthcare plan recently changed and she is unable to go [facility] anymore.  Patient states she has an appointment scheduled within the next 30 days here at [facility] with a new primary care provider but she can't remember his name she is going to see.  Patient states that she took her last Cymbalta this morning and also needs a refill of her Pamelor.  Patient states she has been on Pamelor for years and has done well and her Cymbalta is doing well for her at this time.  Patient has no SI or HI.        Medication Refill   Pertinent negatives include no chest pain, chills, coughing or fatigue.     Review of Systems   Constitutional: Negative for chills and fatigue.   Respiratory: Negative for cough.    Cardiovascular: Negative for chest pain.   Psychiatric/Behavioral: Negative for dysphoric mood, sleep disturbance and suicidal ideas.         Past Medical History:   Diagnosis Date    Anemia     Cancer     GERD (gastroesophageal reflux disease)     Lung cancer      Past Surgical History:   Procedure Laterality Date    LUNG REMOVAL, PARTIAL      [place] REMOVAL OF LUNG,LOBECTOMY Right     Lobectomy     Social History     Social History Narrative    Not on file     Family History   Problem Relation Age of Onset    Alcohol abuse Mother     Hypertension Mother     Alcohol abuse Father     Asthma Sister     Alcohol abuse Brother      Outpatient Encounter Medications as of 1/14/2019   Medication Sig Dispense Refill    DULoxetine (CYMBALTA) 30 MG capsule Take 1 capsule (30 mg total) by mouth  "once daily. 30 capsule 0    meclizine (ANTIVERT) 25 mg tablet Take 1 tablet (25 mg total) by mouth 3 (three) times daily as needed for Dizziness. 60 tablet 0    pantoprazole (PROTONIX) 20 MG tablet Take 1 tablet (20 mg total) by mouth once daily. 30 tablet 11    [DISCONTINUED] DULoxetine (CYMBALTA) 30 MG capsule Take 30 mg by mouth once daily.   2    [DISCONTINUED] nortriptyline (PAMELOR) 50 MG capsule Take 50 mg by mouth every evening.      nortriptyline (PAMELOR) 75 MG Cap TK ONE C PO  ONCE D 30 capsule 0    [DISCONTINUED] nortriptyline (PAMELOR) 75 MG Cap TK ONE C PO  ONCE D  2     No facility-administered encounter medications on file as of 1/14/2019.      Last 3 sets of Vitals  Vitals - 1 value per visit 10/4/2018 11/2/2018 1/14/2019   SYSTOLIC 111 131 120   DIASTOLIC 58 70 65   PULSE 78 87 71   TEMPERATURE 99.4 97.9 98.1   RESPIRATIONS 18 18 -   SPO2 99 - 99   Weight (lb) 154 157.63 161.6   Weight (kg) 69.854 71.5 73.3   HEIGHT 5' 5" 5' 4" 5' 4"   BODY MASS INDEX 25.63 27.06 27.74   VISIT REPORT - - -   Pain Score  - 0 0         Objective:      Physical Exam   Constitutional: She is oriented to person, place, and time. She appears well-developed and well-nourished. No distress.   Pulmonary/Chest: Effort normal.   Neurological: She is alert and oriented to person, place, and time.   Skin: Skin is warm and dry. Capillary refill takes less than 2 seconds. She is not diaphoretic.   Psychiatric: She has a normal mood and affect. Her behavior is normal. Judgment and thought content normal.   Nursing note and vitals reviewed.          Lab Results   Component Value Date    WBC 9.32 10/04/2018    RBC 3.41 (L) 10/04/2018    HGB 10.4 (L) 10/04/2018    HCT 30 (L) 10/04/2018    MCV 99 (H) 10/04/2018    MCH 30.5 10/04/2018    MCHC 31.0 (L) 10/04/2018    RDW 12.6 10/04/2018     10/04/2018    MPV 9.2 10/04/2018    GRAN 7.5 10/04/2018    GRAN 80.0 (H) 10/04/2018    LYMPH 1.2 10/04/2018    LYMPH 13.0 (L) 10/04/2018 "    MONO 0.5 10/04/2018    MONO 5.2 10/04/2018    EOS 0.1 10/04/2018    BASO 0.05 10/04/2018    EOSINOPHIL 1.1 10/04/2018    BASOPHIL 0.5 10/04/2018     Lab Results   Component Value Date    WBC 9.32 10/04/2018    HGB 10.4 (L) 10/04/2018    HCT 30 (L) 10/04/2018     10/04/2018    ALT 11 10/04/2018    AST 23 10/04/2018     10/04/2018    K 4.8 10/04/2018     10/04/2018    CREATININE 1.2 10/04/2018    BUN 18 10/04/2018    CO2 24 10/04/2018       Assessment:       1. Depression, unspecified depression type        Plan:       meds refilled for 30 days.  Pt aware of need to keep scheduled appt with new PCP      Marietta was seen today for annual exam and medication refill.    Diagnoses and all orders for this visit:    Depression, unspecified depression type  -     DULoxetine (CYMBALTA) 30 MG capsule; Take 1 capsule (30 mg total) by mouth once daily.  -     nortriptyline (PAMELOR) 75 MG Cap; TK ONE C PO  ONCE D  -     Urinalysis      Patient Instructions   Follow up with your regular provider as scheduled

## 2019-02-06 ENCOUNTER — OFFICE VISIT (OUTPATIENT)
Dept: INTERNAL MEDICINE | Facility: CLINIC | Age: 69
End: 2019-02-06
Payer: MEDICARE

## 2019-02-06 VITALS
DIASTOLIC BLOOD PRESSURE: 68 MMHG | BODY MASS INDEX: 27.48 KG/M2 | HEART RATE: 89 BPM | HEIGHT: 64 IN | WEIGHT: 160.94 LBS | SYSTOLIC BLOOD PRESSURE: 116 MMHG | OXYGEN SATURATION: 99 %

## 2019-02-06 DIAGNOSIS — R45.84 ANHEDONIA: ICD-10-CM

## 2019-02-06 DIAGNOSIS — Z13.6 ENCOUNTER FOR SCREENING FOR CARDIOVASCULAR DISORDERS: ICD-10-CM

## 2019-02-06 DIAGNOSIS — Z13.89 SCREENING FOR MULTIPLE CONDITIONS: ICD-10-CM

## 2019-02-06 DIAGNOSIS — R42 VERTIGO: ICD-10-CM

## 2019-02-06 DIAGNOSIS — F32.A DEPRESSION, UNSPECIFIED DEPRESSION TYPE: ICD-10-CM

## 2019-02-06 DIAGNOSIS — Z12.31 SCREENING MAMMOGRAM, ENCOUNTER FOR: Primary | ICD-10-CM

## 2019-02-06 PROCEDURE — 99999 PR PBB SHADOW E&M-EST. PATIENT-LVL IV: ICD-10-PCS | Mod: PBBFAC,,, | Performed by: INTERNAL MEDICINE

## 2019-02-06 PROCEDURE — 99999 PR PBB SHADOW E&M-EST. PATIENT-LVL IV: CPT | Mod: PBBFAC,,, | Performed by: INTERNAL MEDICINE

## 2019-02-06 PROCEDURE — 99214 OFFICE O/P EST MOD 30 MIN: CPT | Mod: S$GLB,,, | Performed by: INTERNAL MEDICINE

## 2019-02-06 PROCEDURE — 99214 PR OFFICE/OUTPT VISIT, EST, LEVL IV, 30-39 MIN: ICD-10-PCS | Mod: S$GLB,,, | Performed by: INTERNAL MEDICINE

## 2019-02-06 RX ORDER — DULOXETIN HYDROCHLORIDE 30 MG/1
30 CAPSULE, DELAYED RELEASE ORAL DAILY
Qty: 30 CAPSULE | Refills: 4 | Status: SHIPPED | OUTPATIENT
Start: 2019-02-06 | End: 2019-07-06 | Stop reason: SDUPTHER

## 2019-02-06 RX ORDER — NORTRIPTYLINE HYDROCHLORIDE 75 MG/1
CAPSULE ORAL
Qty: 30 CAPSULE | Refills: 4 | Status: SHIPPED | OUTPATIENT
Start: 2019-02-06 | End: 2019-07-06 | Stop reason: SDUPTHER

## 2019-02-06 RX ORDER — MECLIZINE HYDROCHLORIDE 25 MG/1
25 TABLET ORAL 3 TIMES DAILY PRN
Qty: 30 TABLET | Refills: 3 | Status: SHIPPED | OUTPATIENT
Start: 2019-02-06

## 2019-02-08 ENCOUNTER — HOSPITAL ENCOUNTER (OUTPATIENT)
Dept: RADIOLOGY | Facility: HOSPITAL | Age: 69
Discharge: HOME | End: 2019-02-08
Attending: INTERNAL MEDICINE
Payer: MEDICARE

## 2019-02-08 ENCOUNTER — TELEPHONE (OUTPATIENT)
Dept: INTERNAL MEDICINE | Facility: CLINIC | Age: 69
End: 2019-02-08

## 2019-02-08 VITALS — HEIGHT: 64 IN | WEIGHT: 160 LBS | BODY MASS INDEX: 27.31 KG/M2

## 2019-02-08 DIAGNOSIS — F32.A DEPRESSION, UNSPECIFIED DEPRESSION TYPE: ICD-10-CM

## 2019-02-08 DIAGNOSIS — E03.9 HYPOTHYROIDISM, UNSPECIFIED TYPE: Primary | ICD-10-CM

## 2019-02-08 DIAGNOSIS — Z12.31 SCREENING MAMMOGRAM, ENCOUNTER FOR: ICD-10-CM

## 2019-02-08 PROCEDURE — 77063 BREAST TOMOSYNTHESIS BI: CPT | Mod: 26,,, | Performed by: RADIOLOGY

## 2019-02-08 PROCEDURE — 77067 SCR MAMMO BI INCL CAD: CPT | Mod: 26,,, | Performed by: RADIOLOGY

## 2019-02-08 PROCEDURE — 77067 MAMMO DIGITAL SCREENING BILAT WITH TOMOSYNTHESIS_CAD: ICD-10-PCS | Mod: 26,,, | Performed by: RADIOLOGY

## 2019-02-08 PROCEDURE — 77063 MAMMO DIGITAL SCREENING BILAT WITH TOMOSYNTHESIS_CAD: ICD-10-PCS | Mod: 26,,, | Performed by: RADIOLOGY

## 2019-02-08 PROCEDURE — 77067 SCR MAMMO BI INCL CAD: CPT | Mod: TC

## 2019-02-08 RX ORDER — DULOXETIN HYDROCHLORIDE 30 MG/1
CAPSULE, DELAYED RELEASE ORAL
Qty: 30 CAPSULE | Refills: 0 | OUTPATIENT
Start: 2019-02-08

## 2019-02-08 RX ORDER — NORTRIPTYLINE HYDROCHLORIDE 75 MG/1
CAPSULE ORAL
Qty: 30 CAPSULE | Refills: 0 | OUTPATIENT
Start: 2019-02-08

## 2019-02-08 NOTE — TELEPHONE ENCOUNTER
Pt notified of labs result, understanding verbalized. Please send rx to [facility] [address]

## 2019-02-08 NOTE — TELEPHONE ENCOUNTER
Please contact patient and inform her lab work reveals we need to start her on a thyroid medication.  Please ask her what pharmacy she would like sent to

## 2019-02-11 RX ORDER — LEVOTHYROXINE SODIUM 50 UG/1
50 TABLET ORAL DAILY
Qty: 30 TABLET | Refills: 11 | Status: SHIPPED | OUTPATIENT
Start: 2019-02-11 | End: 2019-12-10

## 2019-02-11 NOTE — PROGRESS NOTES
Subjective:       Patient ID: Marietta Rosales is a 69 y.o. female.    Chief Complaint: Establish Care    HPI  She is here for an initial visit.  Currently without complaint    Past medical history:  Depression, lung cancer, anemia, status post cholecystectomy, status post appendectomy    Medications:  Cymbalta, nortriptyline    Allergies:  Codeine      Review of Systems   Constitutional: Negative for chills, fatigue, fever and unexpected weight change.   Respiratory: Negative for chest tightness and shortness of breath.    Cardiovascular: Negative for chest pain and palpitations.   Gastrointestinal: Negative for abdominal pain and blood in stool.   Neurological: Negative for dizziness, syncope, numbness and headaches.       Objective:      Physical Exam   HENT:   Right Ear: External ear normal.   Left Ear: External ear normal.   Nose: Nose normal.   Mouth/Throat: Oropharynx is clear and moist.   Eyes: Pupils are equal, round, and reactive to light.   Neck: Normal range of motion.   Cardiovascular: Normal rate and regular rhythm.   No murmur heard.  Pulmonary/Chest: Breath sounds normal.   Abdominal: She exhibits no distension. There is no hepatosplenomegaly. There is no tenderness.   Lymphadenopathy:     She has no cervical adenopathy.     She has no axillary adenopathy.   Neurological: She has normal strength and normal reflexes. No cranial nerve deficit or sensory deficit.     breast exam:  No masses palpated, no nipple discharge expressed  Assessment/Plan       Assessment and plan:  Annual exam.  Check lipid panel and TSH.  Schedule mammogram.  She reports having a bone density and colonoscopy within the past year.  Discussed Pap smear, pelvic exam.  She declined all

## 2019-02-12 ENCOUNTER — PATIENT MESSAGE (OUTPATIENT)
Dept: HEMATOLOGY/ONCOLOGY | Facility: CLINIC | Age: 69
End: 2019-02-12

## 2019-02-14 ENCOUNTER — TELEPHONE (OUTPATIENT)
Dept: HEMATOLOGY/ONCOLOGY | Facility: CLINIC | Age: 69
End: 2019-02-14

## 2019-02-14 DIAGNOSIS — Z12.11 COLON CANCER SCREENING: ICD-10-CM

## 2019-02-14 NOTE — TELEPHONE ENCOUNTER
Called patient scheduled scans on 2/20 and follow up with Dr Colby on 23/6.  Mailed appt slip.        ----- Message from Bria Colby MD sent at 2/14/2019 12:03 PM CST -----  [facility],    Can you try scheduling a PET for this patient? She has new insurance. Her old insurance previously denied us. I would like her to get the scan and then see me.    Thanks

## 2019-02-23 ENCOUNTER — HOSPITAL ENCOUNTER (OUTPATIENT)
Dept: RADIOLOGY | Facility: HOSPITAL | Age: 69
Discharge: HOME | End: 2019-02-23
Attending: INTERNAL MEDICINE
Payer: MEDICARE

## 2019-02-23 DIAGNOSIS — C34.91: ICD-10-CM

## 2019-02-23 PROCEDURE — 78815 PET IMAGE W/CT SKULL-THIGH: CPT | Mod: 26,PI,, | Performed by: RADIOLOGY

## 2019-02-23 PROCEDURE — 78815 NM PET CT ROUTINE: ICD-10-PCS | Mod: 26,PI,, | Performed by: RADIOLOGY

## 2019-02-23 PROCEDURE — 78815 PET IMAGE W/CT SKULL-THIGH: CPT | Mod: TC,PI

## 2019-02-23 PROCEDURE — A9552 F18 FDG: HCPCS

## 2019-02-25 LAB — POCT GLUCOSE: 93 MG/DL (ref 70–110)

## 2019-02-26 ENCOUNTER — LAB VISIT (OUTPATIENT)
Dept: LAB | Facility: HOSPITAL | Age: 69
End: 2019-02-26
Payer: MEDICARE

## 2019-02-26 ENCOUNTER — IMMUNIZATION (OUTPATIENT)
Dept: PHARMACY | Facility: CLINIC | Age: 69
End: 2019-02-26
Payer: MEDICARE

## 2019-02-26 ENCOUNTER — OFFICE VISIT (OUTPATIENT)
Dept: INTERNAL MEDICINE | Facility: CLINIC | Age: 69
End: 2019-02-26
Payer: MEDICARE

## 2019-02-26 VITALS
WEIGHT: 163.56 LBS | SYSTOLIC BLOOD PRESSURE: 134 MMHG | BODY MASS INDEX: 27.92 KG/M2 | DIASTOLIC BLOOD PRESSURE: 70 MMHG | HEIGHT: 64 IN | HEART RATE: 84 BPM | OXYGEN SATURATION: 97 %

## 2019-02-26 DIAGNOSIS — M81.0 OSTEOPOROSIS, UNSPECIFIED OSTEOPOROSIS TYPE, UNSPECIFIED PATHOLOGICAL FRACTURE PRESENCE: Primary | ICD-10-CM

## 2019-02-26 DIAGNOSIS — Z76.89 ENCOUNTER TO ESTABLISH CARE WITH NEW DOCTOR: ICD-10-CM

## 2019-02-26 DIAGNOSIS — M81.0 OSTEOPOROSIS, UNSPECIFIED OSTEOPOROSIS TYPE, UNSPECIFIED PATHOLOGICAL FRACTURE PRESENCE: ICD-10-CM

## 2019-02-26 DIAGNOSIS — C34.11 MALIGNANT NEOPLASM OF UPPER LOBE OF RIGHT LUNG: ICD-10-CM

## 2019-02-26 DIAGNOSIS — E03.9 HYPOTHYROIDISM, UNSPECIFIED TYPE: ICD-10-CM

## 2019-02-26 DIAGNOSIS — R19.7 DIARRHEA, UNSPECIFIED TYPE: ICD-10-CM

## 2019-02-26 DIAGNOSIS — F32.A DEPRESSION, UNSPECIFIED DEPRESSION TYPE: ICD-10-CM

## 2019-02-26 LAB
25(OH)D3+25(OH)D2 SERPL-MCNC: 21 NG/ML
TSH SERPL DL<=0.005 MIU/L-ACNC: 1.8 UIU/ML

## 2019-02-26 PROCEDURE — 1101F PT FALLS ASSESS-DOCD LE1/YR: CPT | Mod: CPTII,GC,S$GLB, | Performed by: STUDENT IN AN ORGANIZED HEALTH CARE EDUCATION/TRAINING PROGRAM

## 2019-02-26 PROCEDURE — 86803 HEPATITIS C AB TEST: CPT

## 2019-02-26 PROCEDURE — 1101F PR PT FALLS ASSESS DOC 0-1 FALLS W/OUT INJ PAST YR: ICD-10-PCS | Mod: CPTII,GC,S$GLB, | Performed by: STUDENT IN AN ORGANIZED HEALTH CARE EDUCATION/TRAINING PROGRAM

## 2019-02-26 PROCEDURE — 99499 UNLISTED E&M SERVICE: CPT | Mod: S$GLB,,, | Performed by: STUDENT IN AN ORGANIZED HEALTH CARE EDUCATION/TRAINING PROGRAM

## 2019-02-26 PROCEDURE — 99214 PR OFFICE/OUTPT VISIT, EST, LEVL IV, 30-39 MIN: ICD-10-PCS | Mod: GC,S$GLB,, | Performed by: STUDENT IN AN ORGANIZED HEALTH CARE EDUCATION/TRAINING PROGRAM

## 2019-02-26 PROCEDURE — 99999 PR PBB SHADOW E&M-EST. PATIENT-LVL IV: ICD-10-PCS | Mod: PBBFAC,GC,, | Performed by: STUDENT IN AN ORGANIZED HEALTH CARE EDUCATION/TRAINING PROGRAM

## 2019-02-26 PROCEDURE — 84443 ASSAY THYROID STIM HORMONE: CPT

## 2019-02-26 PROCEDURE — 99214 OFFICE O/P EST MOD 30 MIN: CPT | Mod: GC,S$GLB,, | Performed by: STUDENT IN AN ORGANIZED HEALTH CARE EDUCATION/TRAINING PROGRAM

## 2019-02-26 PROCEDURE — 36415 COLL VENOUS BLD VENIPUNCTURE: CPT

## 2019-02-26 PROCEDURE — 82306 VITAMIN D 25 HYDROXY: CPT

## 2019-02-26 PROCEDURE — 99499 RISK ADDL DX/OHS AUDIT: ICD-10-PCS | Mod: S$GLB,,, | Performed by: STUDENT IN AN ORGANIZED HEALTH CARE EDUCATION/TRAINING PROGRAM

## 2019-02-26 PROCEDURE — 99999 PR PBB SHADOW E&M-EST. PATIENT-LVL IV: CPT | Mod: PBBFAC,GC,, | Performed by: STUDENT IN AN ORGANIZED HEALTH CARE EDUCATION/TRAINING PROGRAM

## 2019-02-26 NOTE — PROGRESS NOTES
"  INTERNAL MEDICINE RESIDENT CLINIC  CLINIC NOTE    Patient Name: Marietta Rosales  YOB: 1950    PRESENTING HISTORY       History of Present Illness:  Ms. Marietta Rosales is a 69 y.o. female with a medical diagnosis of lung cancer s/p RUL lobectomy 2015 and chemotherapy (cisplatin) who comes to the clinic today to establish care.    1. Depression & Anxiety: Patient has a long history of depression and anxiety which has been controlled on Nortriptyline 75mg and Cymbalta 30mg daily. She has had these medications for over 30yrs. Patient currently denies depressed mood, difficulty concentrating, fatigue, change in appetite or sleep. She denies suicidal ideation. Patients states she will like to be off Cymbalta because it causes "foggy moments" and makes her tired after taking it. She reports satisfaction with Nortriptyline.    2. Lung Cancer: Patient was diagnosed with R. Lung adenocarcinoma in 2015, [place]. She had a RUL lobectomy in 2015 and completed 4 cycles of Cisplatin/Pemetrexed. Recent PET scan 2/19 showed no recurrent or metastatic disease. Patient follows up with hematology/oncology at [facility]. She has a 62pk/year smoking history and quit in 1988. She denies any wheezing, difficulty breathing or CHEUNG.    3. Diarrhea/Constipation: Patient reports alternating episodes of diarrhea and constipation. She reports diarrhea when she eats wheat products. She has been tested for celiacs disease in the past via colonoscopy and serology which returned negative. Patients last Colonscopy was in 2015 with repeat due in 2021.     4. Subclinical hypothyroidism: Patient with one TSH of 6.56, T4 1.03. She was recently started on Levothyroxine 50mcg. She denies fatigue, excessive somnolence and weight gain.    5. Osteoporosis: Patient with significant osteoporosis on DEXA in 2016. She was started on an oral bisphosphonate but she stopped taking it because it makes her "feel bad". She reports being a fall risk, " however has no history of fractures.    6. Vertigo: Patient reports occasional vertigo since she started chemotherapy, she states she has not been back to baseline. She takes Meclizine 25mg.        Review of Systems:  Constitutional: no fever or chills  Eyes: no visual changes  ENT: no nasal congestion or sore throat  Respiratory: no cough or shortness of breath  Cardiovascular: no chest pain or palpitations  Gastrointestinal: no nausea or vomiting, no abdominal pain, diarrhea and constipation  Genitourinary: no hematuria or dysuria  Musculoskeletal: no arthralgias or myalgias  Neurological: no seizures or tremors  Skin: No rashes    PAST HISTORY:     Past Medical History:   Diagnosis Date    Anemia     Cancer     GERD (gastroesophageal reflux disease)     Lung cancer        Past Surgical History:   Procedure Laterality Date    LUNG REMOVAL, PARTIAL      [place] REMOVAL OF LUNG,LOBECTOMY Right     Lobectomy       Family History   Problem Relation Age of Onset    Alcohol abuse Mother     Hypertension Mother     Alcohol abuse Father     Asthma Sister     Breast cancer Sister     Alcohol abuse Brother        Social History     Socioeconomic History    Marital status:      Spouse name: None    Number of children: None    Years of education: None    Highest education level: None   Social Needs    Financial resource strain: None    Food insecurity - worry: None    Food insecurity - inability: None    Transportation needs - medical: None    Transportation needs - non-medical: None   Occupational History    None   Tobacco Use    Smoking status: Former Smoker     Packs/day: 2.00     Years: 31.00     Pack years: 62.00     Types: Cigarettes     Last attempt to quit: 1988     Years since quittin.5    Smokeless tobacco: Never Used   Substance and Sexual Activity    Alcohol use: No     Frequency: Never    Drug use: No    Sexual activity: None   Other Topics Concern    None   Social History  "Narrative    None       MEDICATIONS & ALLERGIES:     Current Outpatient Medications on File Prior to Visit   Medication Sig    DULoxetine (CYMBALTA) 30 MG capsule Take 1 capsule (30 mg total) by mouth once daily.    levothyroxine (SYNTHROID) 50 MCG tablet Take 1 tablet (50 mcg total) by mouth once daily.    meclizine (ANTIVERT) 25 mg tablet Take 1 tablet (25 mg total) by mouth 3 (three) times daily as needed for Dizziness.    nortriptyline (PAMELOR) 75 MG Cap TK ONE C PO  ONCE D    pantoprazole (PROTONIX) 20 MG tablet Take 1 tablet (20 mg total) by mouth once daily.     No current facility-administered medications on file prior to visit.        Review of patient's allergies indicates:   Allergen Reactions    Codeine Nausea Only     Pt states she get stomach cramps when she take codeine.       OBJECTIVE:   Vital Signs:  Vitals:    02/26/19 1442 02/26/19 1520   BP: (!) 142/70 134/70   Pulse: 84    SpO2: 97%    Weight: 74.2 kg (163 lb 9.3 oz)    Height: 5' 4" (1.626 m)        No results found for this or any previous visit (from the past 24 hour(s)).      Physical Exam:   General:  Well developed, well nourished, no acute distress  HEENT:  Normocephalic, atraumatic, PERRL, EOMI, clear sclera, throat clear without erythema or exudates  Neck: No carotid bruits. No thyromegaly.   CVS:  RRR, S1 and S2 normal, no murmurs, rubs, gallops,   Resp:  Lungs clear to auscultation, no wheezes, rales, rhonchi, cough  GI:  Abdomen soft, non-tender, non-distended, normoactive bowel sounds, no masses  MSK:  No muscle atrophy, cyanosis, + peripheral edema, full range of motion, chemo port in place Left upper chest wall  Skin:  No rashes, ulcers, erythema  Neuro:  CNII-XII grossly intact. No gross motor/sensory deficits  Psych:  Alert and oriented to person, place, and time    ASSESSMENT & PLAN:     Marietta was seen today for establish care.    Diagnoses and all orders for this visit:    Depression       - Currently controled on " Nortriptyline 75mg and Cymbalta 30mg       - Patient will like to be off the Cymbalta, so plan on tapering.       - Patient to take Cymbalta every other day, monitor for side effects and eventually stop in 6 weeks       - Return to clinic in 6 weeks for evaluation.    Osteoporosis, unspecified osteoporosis type, unspecified pathological fracture presence        -      DEXA scan in 2016 revealed osteoporosis in Lumbar spine, head of femur and hip with T scores ranging from -2.8 to -3.4        -      Originally started on oral bisphosphonate but discontinued due to inability to tolerate. May need to start IV Zoledronic acid pending repeat DEXA        -      Refer to Endocrinology pending DEXA scan        -     Start Calcium and Vitamin supplements  -      Check Vitamin D level  -     DXA Bone Density Spine And Hip; Future    Hypothyroidism, unspecified type  -     Likely subclinical hypothyroidism, TSH 6.56, t4 1.03  -     Currently on Levothyroxine 50mcg  -     Plan to repeat TSH, will follow with serial TSH twice a year  -     May consider discontinuing the Synthroid given lack of symptoms and age. Pending repeat TSH.  -     Plan to return to clinic for follow up in 6 weeks to 3 months.    Malignant neoplasm of upper lobe of right lung       -     Hepatitis C antibody; Future       -     Recent PET scan without evidence of metastatic disease, plan to follow up with heme/onc    Diarrhea, unspecified type        -    Reports diarrhea with consuming wheat products        -    Advised pt to restrict wheat products    Well woman exam  Encounter to establish care with new doctor        - Hepatitis C ab screening        - Patient to receive flu shot today        - Patient to receive to receive Prevnar 23 today        - Repeat Colonoscopy in 2021        - Repeat Mammogram due in 2021      Staffed with Dr. Danilo Edgar, DO  Internal Medicine PGY-1

## 2019-02-27 ENCOUNTER — NURSE TRIAGE (OUTPATIENT)
Dept: ADMINISTRATIVE | Facility: CLINIC | Age: 69
End: 2019-02-27

## 2019-02-27 LAB — HCV AB SERPL QL IA: NEGATIVE

## 2019-02-27 NOTE — TELEPHONE ENCOUNTER
Reason for Disposition   Mild immunization reaction    Protocols used: ST IMMUNIZATION [id number]    Ms. Rosales states she received the influenza and pneumococcal vaccine yesterday. She states she is experiencing warmth,redness, pain and swelling in her arm.

## 2019-02-28 ENCOUNTER — TELEPHONE (OUTPATIENT)
Dept: INTERNAL MEDICINE | Facility: CLINIC | Age: 69
End: 2019-02-28

## 2019-02-28 NOTE — TELEPHONE ENCOUNTER
"Called patient about lab results, advised to discontinue to Synthroid, will recheck TSH at a later date, TSH now wnl. Also advised patient to start taking Vitamin D supplements, will recheck levels at later visit.    Patient now complaining of a "UTI" advised patient to come to clinic for evaluation. She states she will rather not, but will come in if symptoms worsen or fail to improve.    Jenn Edgar, DO  PGY1  "

## 2019-03-01 NOTE — PROGRESS NOTES
I have reviewed the notes, assessments, and/or procedures performed by Dr. Edgar, I concur with her documentation of Marietta Rosales.     1. Depression: on combination of cymbalta 30 mg and nortryptylline 75 mg and would liek to stop cymbalta. Since she is already on the lowest prescribed dose, she has the option to stop versus take every other day for 4-6 weeks and then stop. Patient opted for latter option.    2. Subclinical hypothyroidism: patient last TSH mildly elevated with normal T4. Patient was started on levothyroxine recently and questioning need to continue at this time. Given that TSH <10, advised repeat TSH    3. Osteoporosis: given patient intolerance to oral bisphosphonates, will need to consider newer therapeutic options. Repeat DEXA. If T-score still in osteoporosi range, plan to refer to Endocrine for alternative options. CHeck Vitamin D. Patient should still take calcium and vitamin D daily.    4. Diarrhea - reviewed patient's prior labs through [facility] in Care Everywhere. Test for celiac disease was negative a few years ago. Nonetheless, patient associates symptoms with dietary pattern. Start dietary gluten/wheat restriction and keep food diary to review with follow up

## 2019-03-06 ENCOUNTER — OFFICE VISIT (OUTPATIENT)
Dept: HEMATOLOGY/ONCOLOGY | Facility: CLINIC | Age: 69
End: 2019-03-06
Payer: MEDICARE

## 2019-03-06 VITALS
HEART RATE: 107 BPM | TEMPERATURE: 98 F | DIASTOLIC BLOOD PRESSURE: 69 MMHG | OXYGEN SATURATION: 100 % | SYSTOLIC BLOOD PRESSURE: 132 MMHG | RESPIRATION RATE: 16 BRPM | BODY MASS INDEX: 26.79 KG/M2 | HEIGHT: 64 IN | WEIGHT: 156.94 LBS

## 2019-03-06 DIAGNOSIS — K83.8 DILATION OF COMMON BILE DUCT: Primary | ICD-10-CM

## 2019-03-06 DIAGNOSIS — C34.11 MALIGNANT NEOPLASM OF UPPER LOBE OF RIGHT LUNG: ICD-10-CM

## 2019-03-06 DIAGNOSIS — R19.7 DIARRHEA, UNSPECIFIED TYPE: ICD-10-CM

## 2019-03-06 PROCEDURE — 99215 OFFICE O/P EST HI 40 MIN: CPT | Mod: S$GLB,,, | Performed by: INTERNAL MEDICINE

## 2019-03-06 PROCEDURE — 1101F PT FALLS ASSESS-DOCD LE1/YR: CPT | Mod: CPTII,S$GLB,, | Performed by: INTERNAL MEDICINE

## 2019-03-06 PROCEDURE — 99999 PR PBB SHADOW E&M-EST. PATIENT-LVL III: CPT | Mod: PBBFAC,,, | Performed by: INTERNAL MEDICINE

## 2019-03-06 PROCEDURE — 99215 PR OFFICE/OUTPT VISIT, EST, LEVL V, 40-54 MIN: ICD-10-PCS | Mod: S$GLB,,, | Performed by: INTERNAL MEDICINE

## 2019-03-06 PROCEDURE — 99999 PR PBB SHADOW E&M-EST. PATIENT-LVL III: ICD-10-PCS | Mod: PBBFAC,,, | Performed by: INTERNAL MEDICINE

## 2019-03-06 PROCEDURE — 1101F PR PT FALLS ASSESS DOC 0-1 FALLS W/OUT INJ PAST YR: ICD-10-PCS | Mod: CPTII,S$GLB,, | Performed by: INTERNAL MEDICINE

## 2019-03-06 NOTE — PROGRESS NOTES
Hematology and Medical Oncology   Follow Up     03/06/2019    Primary Oncologic Diagnosis:Bronchogenic Carcinoma    History of Present Ilness:   Marietta Rosales (Marietta) is a pleasant 69 y.o.female who recently moved from the [place] area to [place] and is establishing care at [facility].    Ms. Rosales is an exceptionally pleasant female, who remains active, walking several miles daily. She is about to take in her niece to finish raising her. In 2015 she was diagnosed and treated for stage II adenocarcinoma of the lung.    Oncology History:  --Diagnosed with Stage II (p2T2N0) adenocarcinoma in the spring of 2015 when a right upper lobe nodule was seen on CT in January 2015 and was persistent at 0.8cm in April 2015.  --Underwent a right upper lobectomy on 5/6/15  --Completed 4 cycles of Cisplatin/Pemetrexed in early September 2105.  --All surveillance scans have been disease free  --Brain MRI on 11/9/18 No evidence of intracranial metastatic disease.  --PET on 2/23/19 No FDG PET/CT findings to suggest recurrent or metastatic disease.Marked dilatation of the intrahepatic and extrahepatic bile ducts, with the common bile duct measuring up to 2.3 cm.  Recommend correlation with prior imaging and further evaluation may be warranted.    Interval History:  Ms. Rosales is doing fair. She is under a substantial amount of stress due to family issues. She has lost weight because of lack of appetite which she attributes to the stress.  Of note she has been experiencing RUQ pain and intermittent diarrhea.     PAST MEDICAL HISTORY:   Past Medical History:   Diagnosis Date    Anemia     Cancer     GERD (gastroesophageal reflux disease)     Lung cancer        PAST SURGICAL HISTORY:   Past Surgical History:   Procedure Laterality Date    LUNG REMOVAL, PARTIAL      [place] REMOVAL OF LUNG,LOBECTOMY Right     Lobectomy       PAST SOCIAL HISTORY:   reports that she quit smoking about 30 years ago. Her smoking use included  cigarettes. She has a 62.00 pack-year smoking history. she has never used smokeless tobacco. She reports that she does not drink alcohol or use drugs.    FAMILY HISTORY:  Family History   Problem Relation Age of Onset    Alcohol abuse Mother     Hypertension Mother     Alcohol abuse Father     Asthma Sister     Breast cancer Sister     Alcohol abuse Brother        CURRENT MEDICATIONS:   Current Outpatient Medications   Medication Sig    DULoxetine (CYMBALTA) 30 MG capsule Take 1 capsule (30 mg total) by mouth once daily.    levothyroxine (SYNTHROID) 50 MCG tablet Take 1 tablet (50 mcg total) by mouth once daily.    meclizine (ANTIVERT) 25 mg tablet Take 1 tablet (25 mg total) by mouth 3 (three) times daily as needed for Dizziness.    nortriptyline (PAMELOR) 75 MG Cap TK ONE C PO  ONCE D    pantoprazole (PROTONIX) 20 MG tablet Take 1 tablet (20 mg total) by mouth once daily.     No current facility-administered medications for this visit.      ALLERGIES:   Review of patient's allergies indicates:  No Known Allergies      Review of Systems:     Review of Systems   Constitutional: Negative for appetite change, chills, diaphoresis, fatigue, fever and unexpected weight change.   HENT:   Negative for hearing loss, mouth sores, nosebleeds, sore throat, trouble swallowing and voice change.    Eyes: Negative for eye problems and icterus.   Respiratory: Negative for chest tightness, cough, hemoptysis, shortness of breath and wheezing.    Cardiovascular: Negative for chest pain, leg swelling and palpitations.   Gastrointestinal: Negative for abdominal distention, abdominal pain, blood in stool, diarrhea, nausea and vomiting.   Endocrine: Negative for hot flashes.   Genitourinary: Negative for bladder incontinence, difficulty urinating, dysuria and hematuria.    Musculoskeletal: Positive for arthralgias. Negative for back pain, flank pain, gait problem, myalgias, neck pain and neck stiffness.   Skin: Negative for  itching, rash and wound.   Neurological: Negative for dizziness, extremity weakness, gait problem, headaches, numbness, seizures and speech difficulty.   Hematological: Negative for adenopathy. Does not bruise/bleed easily.   Psychiatric/Behavioral: Negative for confusion, depression and sleep disturbance. The patient is not nervous/anxious.           Physical Exam:     Vitals:    03/06/19 1507   BP: 132/69   Pulse: 107   Resp: 16   Temp: 98.4 °F (36.9 °C)       Physical Exam   Constitutional: She is oriented to person, place, and time. She appears well-developed and well-nourished. No distress.   HENT:   Head: Normocephalic and atraumatic.   Mouth/Throat: Oropharynx is clear and moist. No oropharyngeal exudate.   Eyes: Conjunctivae and EOM are normal. Pupils are equal, round, and reactive to light.   Neck: Normal range of motion. Neck supple. No JVD present. No tracheal deviation present. No thyromegaly present.   Cardiovascular: Normal rate, regular rhythm and normal heart sounds. Exam reveals no friction rub.   No murmur heard.  Pulmonary/Chest: Effort normal. No stridor. No respiratory distress. She has no wheezes. She has no rales. She exhibits no tenderness.   decreased breath sounds on right   Abdominal: Soft. Bowel sounds are normal. She exhibits no distension. There is no tenderness. There is no rebound and no guarding.   Musculoskeletal: Normal range of motion. She exhibits no edema, tenderness or deformity.   Lymphadenopathy:     She has no axillary adenopathy.   Neurological: She is alert and oriented to person, place, and time. She displays normal reflexes. No cranial nerve deficit or sensory deficit. She exhibits normal muscle tone. Coordination normal.   Skin: Skin is warm and dry. Capillary refill takes less than 2 seconds. No rash noted. She is not diaphoretic. No erythema. No pallor.   Psychiatric: She has a normal mood and affect. Her behavior is normal. Judgment and thought content normal.        ECOG Performance Status: (foot note - ECOG PS provided by Eastern Cooperative Oncology Group) 0 - Asymptomatic    Karnofsky Performance Score:  90%- Able to Carry on Normal Activity: Minor Symptoms of Disease    Labs:   Lab Results   Component Value Date    WBC 9.32 10/04/2018    HGB 10.4 (L) 10/04/2018    HCT 30 (L) 10/04/2018     10/04/2018    CHOL 215 (H) 02/08/2019    TRIG 87 02/08/2019    HDL 67 02/08/2019    ALT 11 10/04/2018    AST 23 10/04/2018     10/04/2018    K 4.8 10/04/2018     10/04/2018    CREATININE 1.2 10/04/2018    BUN 18 10/04/2018    CO2 24 10/04/2018    TSH 1.799 02/26/2019         Imaging: Previous imaging has been reviewed   Assessment and Plan:     Ms. Rosales is a 69 year old female who is here for ongoing lung cancer monitoring    Adenocarcinoma of the Right Lung  --Stage II, treated with lobectomy and Cis/Pem x 4 at [facility]  --baseline PET and MRI brain show no evidence of disease  --Currently no active signs of lung cancer  --Pt has requested that her port be removed    Biliary Dilatation  --Seen on imaging and patient endorses symptoms  --I have ordered an abdominal ultrasound to better evaluate the issue  --Will refer to GI pending imaging    30 minutes were spent face to face with the patient to discuss the disease, natural history, treatment options and survival statistics. I have provided the patient with an opportunity to ask questions and have all questions answered to her satisfaction.       she will return to clinic in 6 months or sooner if needed, but knows to call in the interim if symptoms change or should a problem arise.        Bria Colby MD  Hematology and Medical Oncology  Bone Marrow Transplant  [facility]

## 2019-03-07 ENCOUNTER — TELEPHONE (OUTPATIENT)
Dept: HEMATOLOGY/ONCOLOGY | Facility: CLINIC | Age: 69
End: 2019-03-07

## 2019-03-07 NOTE — TELEPHONE ENCOUNTER
Called patient port removal scheduled for 3/19 at 10:00 am.        ----- Message from Bria Colby MD sent at 3/7/2019 11:06 AM CST -----  1. Port removal   2. abd ultrasound this week  3. Follow up with me in 6 months with cbc,cmp

## 2019-03-07 NOTE — TELEPHONE ENCOUNTER
Called patient scheduled ultra sound for 3/14. Sent message to Barbara for port removal.        ----- Message from Bria Colby MD sent at 3/7/2019 11:06 AM CST -----  1. Port removal   2. abd ultrasound this week  3. Follow up with me in 6 months with cbc,cmp

## 2019-03-11 ENCOUNTER — HOSPITAL ENCOUNTER (OUTPATIENT)
Dept: RADIOLOGY | Facility: CLINIC | Age: 69
Discharge: HOME | End: 2019-03-11
Attending: STUDENT IN AN ORGANIZED HEALTH CARE EDUCATION/TRAINING PROGRAM
Payer: MEDICARE

## 2019-03-11 DIAGNOSIS — M81.0 OSTEOPOROSIS, UNSPECIFIED OSTEOPOROSIS TYPE, UNSPECIFIED PATHOLOGICAL FRACTURE PRESENCE: ICD-10-CM

## 2019-03-11 PROCEDURE — 77080 DEXA BONE DENSITY SPINE HIP: ICD-10-PCS | Mod: 26,,, | Performed by: INTERNAL MEDICINE

## 2019-03-11 PROCEDURE — 77080 DXA BONE DENSITY AXIAL: CPT | Mod: 26,,, | Performed by: INTERNAL MEDICINE

## 2019-03-11 PROCEDURE — 77080 DXA BONE DENSITY AXIAL: CPT | Mod: TC

## 2019-03-13 DIAGNOSIS — K83.8 DILATION OF COMMON BILE DUCT: Primary | ICD-10-CM

## 2019-03-14 ENCOUNTER — HOSPITAL ENCOUNTER (OUTPATIENT)
Dept: RADIOLOGY | Facility: HOSPITAL | Age: 69
Discharge: HOME | End: 2019-03-14
Attending: INTERNAL MEDICINE
Payer: MEDICARE

## 2019-03-14 ENCOUNTER — TELEPHONE (OUTPATIENT)
Dept: HEMATOLOGY/ONCOLOGY | Facility: CLINIC | Age: 69
End: 2019-03-14

## 2019-03-14 DIAGNOSIS — K83.8 COMMON BILE DUCT DILATION: Primary | ICD-10-CM

## 2019-03-14 DIAGNOSIS — K83.8 DILATION OF COMMON BILE DUCT: ICD-10-CM

## 2019-03-14 PROCEDURE — 76700 US EXAM ABDOM COMPLETE: CPT | Mod: TC

## 2019-03-14 PROCEDURE — 76700 US ABDOMEN COMPLETE: ICD-10-PCS | Mod: 26,,, | Performed by: RADIOLOGY

## 2019-03-14 PROCEDURE — 76700 US EXAM ABDOM COMPLETE: CPT | Mod: 26,,, | Performed by: RADIOLOGY

## 2019-03-14 NOTE — TELEPHONE ENCOUNTER
Called patient left message to call the clinic about scheduling appointment (MRI). Called the gastro dept message was sent to Lupe to call patient to schedule appointment.  Number was left on voice mail.    ----- Message from Bria Colby MD sent at 3/14/2019 10:11 AM CDT -----  Please schedule an MRCP and then a consult with GI.

## 2019-03-15 ENCOUNTER — HOSPITAL ENCOUNTER (OUTPATIENT)
Dept: RADIOLOGY | Facility: HOSPITAL | Age: 69
Discharge: HOME | End: 2019-03-15
Attending: INTERNAL MEDICINE
Payer: MEDICARE

## 2019-03-15 DIAGNOSIS — K83.8 COMMON BILE DUCT DILATION: ICD-10-CM

## 2019-03-15 PROCEDURE — 76376 3D RENDER W/INTRP POSTPROCES: CPT | Mod: 26,,, | Performed by: RADIOLOGY

## 2019-03-15 PROCEDURE — 74181 MRI ABDOMEN W/O CONTRAST: CPT | Mod: 26,,, | Performed by: RADIOLOGY

## 2019-03-15 PROCEDURE — 74181 MRI ABDOMEN WITHOUT CONTRAST MRCP: ICD-10-PCS | Mod: 26,,, | Performed by: RADIOLOGY

## 2019-03-15 PROCEDURE — 74181 MRI ABDOMEN W/O CONTRAST: CPT | Mod: TC

## 2019-03-15 PROCEDURE — 76376 MRI ABDOMEN WITHOUT CONTRAST MRCP: ICD-10-PCS | Mod: 26,,, | Performed by: RADIOLOGY

## 2019-03-15 PROCEDURE — 76376 3D RENDER W/INTRP POSTPROCES: CPT | Mod: TC

## 2019-03-17 ENCOUNTER — PATIENT MESSAGE (OUTPATIENT)
Dept: HEMATOLOGY/ONCOLOGY | Facility: CLINIC | Age: 69
End: 2019-03-17

## 2019-03-18 ENCOUNTER — TELEPHONE (OUTPATIENT)
Dept: ENDOSCOPY | Facility: HOSPITAL | Age: 69
End: 2019-03-18

## 2019-03-18 DIAGNOSIS — R94.4 RENAL FUNCTION TEST ABNORMAL: ICD-10-CM

## 2019-03-18 DIAGNOSIS — N18.30 CKD (CHRONIC KIDNEY DISEASE) STAGE 3, GFR 30-59 ML/MIN: Primary | ICD-10-CM

## 2019-03-18 DIAGNOSIS — K83.8 COMMON BILE DUCT DILATATION: ICD-10-CM

## 2019-03-18 DIAGNOSIS — K83.8 DILATED BILE DUCT: Primary | ICD-10-CM

## 2019-03-18 NOTE — TELEPHONE ENCOUNTER
----- Message from Familia Montes De Oca MD sent at 3/18/2019  9:28 AM CDT -----  Contact: Jessi Colby- [phone]  Did I say EUS? Dilated duct    Elisa or  stan mortensen ok  ----- Message -----  From: Elizabeth Castillo MA  Sent: 3/14/2019  12:34 PM  To: Familia Montes De Oca MD    Please review and advise  ----- Message -----  From: Jessica Salamanca  Sent: 3/14/2019  10:54 AM  To: Elodia STEPHEN Staff Ernie Clifton- [place] called to schedule the pt an appt for Common bile duct dilation [K83.8]- please contact pt at [phone]

## 2019-03-19 ENCOUNTER — HOSPITAL ENCOUNTER (OUTPATIENT)
Facility: HOSPITAL | Age: 69
Discharge: HOME | End: 2019-03-19
Attending: RADIOLOGY | Admitting: RADIOLOGY
Payer: MEDICARE

## 2019-03-19 VITALS
OXYGEN SATURATION: 100 % | HEIGHT: 64 IN | WEIGHT: 157 LBS | BODY MASS INDEX: 26.8 KG/M2 | RESPIRATION RATE: 18 BRPM | SYSTOLIC BLOOD PRESSURE: 126 MMHG | DIASTOLIC BLOOD PRESSURE: 60 MMHG | TEMPERATURE: 98 F | HEART RATE: 71 BPM

## 2019-03-19 DIAGNOSIS — K83.8 DILATION OF COMMON BILE DUCT: ICD-10-CM

## 2019-03-19 DIAGNOSIS — Z45.2 ENCOUNTER FOR CARE RELATED TO VASCULAR ACCESS PORT: ICD-10-CM

## 2019-03-19 PROCEDURE — 63600175 PHARM REV CODE 636 W HCPCS: Performed by: NURSE PRACTITIONER

## 2019-03-19 PROCEDURE — 25000003 PHARM REV CODE 250: Performed by: NURSE PRACTITIONER

## 2019-03-19 RX ORDER — FENTANYL CITRATE 50 UG/ML
50 INJECTION, SOLUTION INTRAMUSCULAR; INTRAVENOUS
Status: DISCONTINUED | OUTPATIENT
Start: 2019-03-19 | End: 2019-03-19 | Stop reason: HOSPADM

## 2019-03-19 RX ORDER — SODIUM CHLORIDE 9 MG/ML
500 INJECTION, SOLUTION INTRAVENOUS ONCE
Status: COMPLETED | OUTPATIENT
Start: 2019-03-19 | End: 2019-03-19

## 2019-03-19 RX ORDER — CEFAZOLIN SODIUM 1 G/3ML
1 INJECTION, POWDER, FOR SOLUTION INTRAMUSCULAR; INTRAVENOUS
Status: COMPLETED | OUTPATIENT
Start: 2019-03-19 | End: 2019-03-19

## 2019-03-19 RX ORDER — MIDAZOLAM HYDROCHLORIDE 1 MG/ML
1 INJECTION INTRAMUSCULAR; INTRAVENOUS
Status: DISCONTINUED | OUTPATIENT
Start: 2019-03-19 | End: 2019-03-19 | Stop reason: HOSPADM

## 2019-03-19 RX ADMIN — CEFAZOLIN 1 G: 330 INJECTION, POWDER, FOR SOLUTION INTRAMUSCULAR; INTRAVENOUS at 03:03

## 2019-03-19 RX ADMIN — SODIUM CHLORIDE 500 ML: 0.9 INJECTION, SOLUTION INTRAVENOUS at 12:03

## 2019-03-19 NOTE — PROGRESS NOTES
Pt discharged to home.  Discharge instructions given, pt stated understanding.  Dressing to chest dry and intact, IV removed.

## 2019-03-19 NOTE — H&P
Radiology History & Physical      SUBJECTIVE:     History of Present Illness:  Marietta Rosales is a 69 y.o. female who presents for port removal.  Past Medical History:   Diagnosis Date    Anemia     Cancer     GERD (gastroesophageal reflux disease)     Lung cancer      Past Surgical History:   Procedure Laterality Date    LUNG REMOVAL, PARTIAL      [place] REMOVAL OF LUNG,LOBECTOMY Right     Lobectomy       Home Meds:   Prior to Admission medications    Medication Sig Start Date End Date Taking? Authorizing Provider   DULoxetine (CYMBALTA) 30 MG capsule Take 1 capsule (30 mg total) by mouth once daily. 2/6/19  Yes Siria Cummins MD   meclizine (ANTIVERT) 25 mg tablet Take 1 tablet (25 mg total) by mouth 3 (three) times daily as needed for Dizziness. 2/6/19  Yes Siria Cummins MD   nortriptyline (PAMELOR) 75 MG Cap TK ONE C PO  ONCE D 2/6/19  Yes Siria Cummins MD   levothyroxine (SYNTHROID) 50 MCG tablet Take 1 tablet (50 mcg total) by mouth once daily. 2/11/19 2/11/20  Siria Cummins MD   pantoprazole (PROTONIX) 20 MG tablet Take 1 tablet (20 mg total) by mouth once daily. 9/4/18 9/4/19  Christie Mosqueda NP     Anticoagulants/Antiplatelets: no anticoagulation    Allergies:   Review of patient's allergies indicates:   Allergen Reactions    Codeine Nausea Only     Pt states she get stomach cramps when she take codeine.     Sedation History:  no adverse reactions    Review of Systems:   Hematological: no known coagulopathies  Respiratory: no shortness of breath  Cardiovascular: no chest pain  Gastrointestinal: no abdominal pain  Genito-Urinary: no dysuria  Musculoskeletal: negative  Neurological: no TIA or stroke symptoms         OBJECTIVE:     Vital Signs (Most Recent)  Temp: 97.7 °F (36.5 °C) (03/19/19 1234)  Pulse: 78 (03/19/19 1234)  Resp: 18 (03/19/19 1234)  BP: 121/60 (03/19/19 1234)  SpO2: 100 % (03/19/19 1234)    Physical Exam:  ASA: II  Mallampati: III    General: no acute distress  Mental  Status: alert and oriented to person, place and time  HEENT: normocephalic, atraumatic  Chest: unlabored breathing  Heart: regular heart rate  Abdomen: nondistended  Extremity: moves all extremities    Laboratory  Lab Results   Component Value Date    INR 0.9 03/19/2019       Lab Results   Component Value Date    WBC 4.63 03/19/2019    HGB 11.0 (L) 03/19/2019    HCT 35.3 (L) 03/19/2019    MCV 96 03/19/2019     03/19/2019      Lab Results   Component Value Date     10/04/2018     10/04/2018    K 4.8 10/04/2018     10/04/2018    CO2 24 10/04/2018    BUN 18 10/04/2018    CREATININE 1.2 10/04/2018    CALCIUM 9.7 10/04/2018    ALT 11 10/04/2018    AST 23 10/04/2018    ALBUMIN 4.1 10/04/2018    BILITOT 0.4 10/04/2018       ASSESSMENT/PLAN:     Sedation Plan: Local  Patient will undergo left chest port removal.    Efra Gonzalez M.D.  PGY-2  Radiology

## 2019-03-19 NOTE — PROGRESS NOTES
Pt prepped for IR, pt does not have a ride home, pt is taking a cab IR notified, IR will proceed with local and no sedation, pt agreed to plan of care. Belongings under stretcher. Will continue to monitor.

## 2019-03-19 NOTE — PROCEDURES
"Radiology Post-Procedure Note    Pre Op Diagnosis: port no longer needed    Post Op Diagnosis: Same    Procedure: PORT removal    Procedure performed by: Carlos Barroso and Gimenez    Written Informed Consent Obtained: Yes    Specimen Removed: yes, port and catheter    Estimated Blood Loss: Minimal    Findings:   An incision was made over left port after administration of lidocaine. The suture holding in the port was removed and the entire port and catheter were removed. The pocket was flushed with normal saline. The incision was then sutured with vicryl and glued with dermabond. A dressing was then placed.       Carmelo Barroso MD ([facility])  Radiology PGY-5  [phone]        "

## 2019-03-19 NOTE — DISCHARGE SUMMARY
"Radiology Discharge Summary      Hospital Course: No complications    Admit Date: 3/19/2019  Discharge Date: 03/19/2019     Instructions Given to Patient: Yes  Diet: Resume prior diet  Activity: activity as tolerated and no driving for today    Description of Condition on Discharge: Stable  Vital Signs (Most Recent): Temp: 97.7 °F (36.5 °C) (03/19/19 1234)  Pulse: 71 (03/19/19 1630)  Resp: 18 (03/19/19 1630)  BP: 126/60 (03/19/19 1630)  SpO2: 100 % (03/19/19 1630)    Discharge Disposition: Home    Discharge Diagnosis: port no longer required     Follow-up: as scheduled    Carmelo Barroso MD ([facility])  Radiology PGY-5  [phone]      "

## 2019-03-20 ENCOUNTER — TELEPHONE (OUTPATIENT)
Dept: ENDOSCOPY | Facility: HOSPITAL | Age: 69
End: 2019-03-20

## 2019-03-20 NOTE — TELEPHONE ENCOUNTER
Spoke with patient. EUS scheduled for 4/2 at 9a. Reviewed prep instructions. Ms Rosales verbalized understanding.

## 2019-03-21 ENCOUNTER — TELEPHONE (OUTPATIENT)
Dept: ENDOSCOPY | Facility: HOSPITAL | Age: 69
End: 2019-03-21

## 2019-03-28 ENCOUNTER — TELEPHONE (OUTPATIENT)
Dept: ENDOSCOPY | Facility: HOSPITAL | Age: 69
End: 2019-03-28

## 2019-04-10 RX ORDER — LIDOCAINE HYDROCHLORIDE 10 MG/ML
1 INJECTION, SOLUTION EPIDURAL; INFILTRATION; INTRACAUDAL; PERINEURAL ONCE
Status: CANCELLED | OUTPATIENT
Start: 2019-04-10 | End: 2019-04-10

## 2019-04-11 ENCOUNTER — ANESTHESIA (OUTPATIENT)
Dept: ENDOSCOPY | Facility: HOSPITAL | Age: 69
End: 2019-04-11
Payer: MEDICARE

## 2019-04-11 ENCOUNTER — HOSPITAL ENCOUNTER (OUTPATIENT)
Facility: HOSPITAL | Age: 69
Discharge: HOME | End: 2019-04-11
Attending: INTERNAL MEDICINE | Admitting: INTERNAL MEDICINE
Payer: MEDICARE

## 2019-04-11 ENCOUNTER — ANESTHESIA EVENT (OUTPATIENT)
Dept: ENDOSCOPY | Facility: HOSPITAL | Age: 69
End: 2019-04-11
Payer: MEDICARE

## 2019-04-11 VITALS
HEIGHT: 64 IN | BODY MASS INDEX: 25.61 KG/M2 | DIASTOLIC BLOOD PRESSURE: 68 MMHG | WEIGHT: 150 LBS | SYSTOLIC BLOOD PRESSURE: 136 MMHG | OXYGEN SATURATION: 100 % | RESPIRATION RATE: 18 BRPM | TEMPERATURE: 98 F | HEART RATE: 77 BPM

## 2019-04-11 DIAGNOSIS — K83.8 DILATED BILE DUCT: Primary | ICD-10-CM

## 2019-04-11 PROCEDURE — 63600175 PHARM REV CODE 636 W HCPCS: Performed by: NURSE ANESTHETIST, CERTIFIED REGISTERED

## 2019-04-11 PROCEDURE — D9220A PRA ANESTHESIA: ICD-10-PCS | Mod: ANES,,, | Performed by: ANESTHESIOLOGY

## 2019-04-11 PROCEDURE — 43259 EGD US EXAM DUODENUM/JEJUNUM: CPT | Mod: ,,, | Performed by: INTERNAL MEDICINE

## 2019-04-11 PROCEDURE — 37000008 HC ANESTHESIA 1ST 15 MINUTES: Performed by: INTERNAL MEDICINE

## 2019-04-11 PROCEDURE — 43259 EGD US EXAM DUODENUM/JEJUNUM: CPT | Performed by: INTERNAL MEDICINE

## 2019-04-11 PROCEDURE — D9220A PRA ANESTHESIA: Mod: CRNA,,, | Performed by: NURSE ANESTHETIST, CERTIFIED REGISTERED

## 2019-04-11 PROCEDURE — D9220A PRA ANESTHESIA: ICD-10-PCS | Mod: CRNA,,, | Performed by: NURSE ANESTHETIST, CERTIFIED REGISTERED

## 2019-04-11 PROCEDURE — 37000009 HC ANESTHESIA EA ADD 15 MINS: Performed by: INTERNAL MEDICINE

## 2019-04-11 PROCEDURE — 25000003 PHARM REV CODE 250: Performed by: NURSE ANESTHETIST, CERTIFIED REGISTERED

## 2019-04-11 PROCEDURE — D9220A PRA ANESTHESIA: Mod: ANES,,, | Performed by: ANESTHESIOLOGY

## 2019-04-11 PROCEDURE — 43259 PR ENDOSCOPIC ULTRASOUND EXAM: ICD-10-PCS | Mod: ,,, | Performed by: INTERNAL MEDICINE

## 2019-04-11 RX ORDER — SODIUM CHLORIDE 0.9 % (FLUSH) 0.9 %
10 SYRINGE (ML) INJECTION
Status: DISCONTINUED | OUTPATIENT
Start: 2019-04-11 | End: 2019-04-11 | Stop reason: HOSPADM

## 2019-04-11 RX ORDER — PROPOFOL 10 MG/ML
VIAL (ML) INTRAVENOUS
Status: DISCONTINUED | OUTPATIENT
Start: 2019-04-11 | End: 2019-04-11

## 2019-04-11 RX ORDER — SODIUM CHLORIDE 9 MG/ML
INJECTION, SOLUTION INTRAVENOUS CONTINUOUS PRN
Status: DISCONTINUED | OUTPATIENT
Start: 2019-04-11 | End: 2019-04-11

## 2019-04-11 RX ORDER — PROPOFOL 10 MG/ML
VIAL (ML) INTRAVENOUS CONTINUOUS PRN
Status: DISCONTINUED | OUTPATIENT
Start: 2019-04-11 | End: 2019-04-11

## 2019-04-11 RX ADMIN — PROPOFOL 150 MCG/KG/MIN: 10 INJECTION, EMULSION INTRAVENOUS at 08:04

## 2019-04-11 RX ADMIN — PROPOFOL 80 MG: 10 INJECTION, EMULSION INTRAVENOUS at 08:04

## 2019-04-11 RX ADMIN — SODIUM CHLORIDE: 9 INJECTION, SOLUTION INTRAVENOUS at 08:04

## 2019-04-11 NOTE — TRANSFER OF CARE
"Anesthesia Transfer of Care Note    Patient: Marietta Rosales    Procedure(s) Performed: Procedure(s) (LRB):  ULTRASOUND, UPPER GI TRACT, ENDOSCOPIC (N/A)    Patient location: [facility]    Anesthesia Type: general    Transport from OR: Transported from OR on 2-3 L/min O2 by NC with adequate spontaneous ventilation    Post pain: adequate analgesia    Post assessment: no apparent anesthetic complications and tolerated procedure well    Post vital signs: stable    Level of consciousness: sedated and responds to stimulation    Nausea/Vomiting: no nausea/vomiting    Complications: none    Transfer of care protocol was followed      Last vitals:   Visit Vitals  /70 (BP Location: Left arm, Patient Position: Lying)   Pulse 78   Temp 36.3 °C (97.3 °F) (Temporal)   Resp 18   Ht 5' 4" (1.626 m)   Wt 68 kg (150 lb)   SpO2 96%   Breastfeeding? No   BMI 25.75 kg/m²     "

## 2019-04-11 NOTE — H&P
Short Stay Endoscopy History and Physical    PCP - Jenn Edgar DO  Referring Physician - Jamil Weathers MD  [address] [place] [address]    Procedure - eus  ASA - per anesthesia  Mallampati - per anesthesia  History of Anesthesia problems - no  Family history Anesthesia problems -  no   Plan of anesthesia - General    HPI:  This is a 69 y.o. female here for evaluation of: dilated duct      Reflux - no  Dysphagia - no  Abdominal pain - no  Diarrhea - no    ROS:  Constitutional: No fevers, chills, No weight loss  CV: No chest pain  Pulm: No cough, No shortness of breath  Ophtho: No vision changes  GI: see HPI  Derm: No rash    Medical History:  has a past medical history of Anemia, Cancer, Dilated bile duct, GERD (gastroesophageal reflux disease), Hypothyroid, and Lung cancer.    Surgical History:  has a past surgical history that includes pr removal of lung,lobectomy (Right); Lung removal, partial; and Mediport removal (N/A, 3/19/2019).    Family History: family history includes Alcohol abuse in her brother, father, and mother; Asthma in her sister; Breast cancer in her sister; Hypertension in her mother..    Social History:  reports that she quit smoking about 30 years ago. Her smoking use included cigarettes. She has a 62.00 pack-year smoking history. She has never used smokeless tobacco. She reports that she does not drink alcohol or use drugs.    Review of patient's allergies indicates:   Allergen Reactions    Codeine Nausea Only     Pt states she get stomach cramps when she take codeine.       Medications:   Medications Prior to Admission   Medication Sig Dispense Refill Last Dose    DULoxetine (CYMBALTA) 30 MG capsule Take 1 capsule (30 mg total) by mouth once daily. 30 capsule 4 4/10/2019 at Unknown time    nortriptyline (PAMELOR) 75 MG Cap TK ONE C PO  ONCE D 30 capsule 4 4/10/2019 at Unknown time    levothyroxine (SYNTHROID) 50 MCG tablet Take 1 tablet (50 mcg total) by mouth once  daily. 30 tablet 11 Unknown at Unknown time    meclizine (ANTIVERT) 25 mg tablet Take 1 tablet (25 mg total) by mouth 3 (three) times daily as needed for Dizziness. 30 tablet 3 Unknown at Unknown time    pantoprazole (PROTONIX) 20 MG tablet Take 1 tablet (20 mg total) by mouth once daily. 30 tablet 11 More than a month at Unknown time       Physical Exam:    Vital Signs:   Vitals:    04/11/19 0750   BP: 135/70   Pulse: 78   Resp: 18   Temp: 97.3 °F (36.3 °C)       General Appearance: Well appearing in no acute distress    Labs:  Lab Results   Component Value Date    WBC 4.63 03/19/2019    HGB 11.0 (L) 03/19/2019    HCT 35.3 (L) 03/19/2019     03/19/2019    CHOL 215 (H) 02/08/2019    TRIG 87 02/08/2019    HDL 67 02/08/2019    ALT 11 10/04/2018    AST 23 10/04/2018     10/04/2018    K 4.8 10/04/2018     10/04/2018    CREATININE 1.2 10/04/2018    BUN 18 10/04/2018    CO2 24 10/04/2018    TSH 1.799 02/26/2019    INR 0.9 03/19/2019       I have explained the risks and benefits of this endoscopic procedure to the patient including but not limited to bleeding, inflammation, infection, perforation, and death.      Familia Montes De Oca MD

## 2019-04-11 NOTE — ANESTHESIA PREPROCEDURE EVALUATION
04/11/2019  Marietta Rosales is a 69 y.o., female.  Pre-operative evaluation for Procedure(s) (LRB):  ULTRASOUND, UPPER GI TRACT, ENDOSCOPIC (N/A)    Marietta Rosales is a 69 y.o. female     LDA:     Prev airway:     Drips:     Patient Active Problem List   Diagnosis    Renal function test abnormal    Depression    Malignant neoplasm of upper lobe of right lung    CKD (chronic kidney disease) stage 3, GFR 30-59 ml/min    Osteoporosis    Hypothyroidism    Diarrhea    Encounter for care related to vascular access port    Dilated bile duct       Review of patient's allergies indicates:   Allergen Reactions    Codeine Nausea Only     Pt states she get stomach cramps when she take codeine.        No current facility-administered medications on file prior to encounter.      Current Outpatient Medications on File Prior to Encounter   Medication Sig Dispense Refill    DULoxetine (CYMBALTA) 30 MG capsule Take 1 capsule (30 mg total) by mouth once daily. 30 capsule 4    nortriptyline (PAMELOR) 75 MG Cap TK ONE C PO  ONCE D 30 capsule 4    levothyroxine (SYNTHROID) 50 MCG tablet Take 1 tablet (50 mcg total) by mouth once daily. 30 tablet 11    meclizine (ANTIVERT) 25 mg tablet Take 1 tablet (25 mg total) by mouth 3 (three) times daily as needed for Dizziness. 30 tablet 3    pantoprazole (PROTONIX) 20 MG tablet Take 1 tablet (20 mg total) by mouth once daily. 30 tablet 11       Past Surgical History:   Procedure Laterality Date    LUNG REMOVAL, PARTIAL      MD REMOVAL OF LUNG,LOBECTOMY Right     Lobectomy    REMOVAL, CATHETER, CENTRAL VENOUS, TUNNELED, WITH PORT N/A 3/19/2019    Performed by [facility] Diagnostic Provider at [facility] OR [address] [place] [address]       Social History     Socioeconomic History    Marital status:      Spouse name: Not on file    Number of children: Not on file    Years of education: Not on file     Highest education level: Not on file   Occupational History    Not on file   Social Needs    Financial resource strain: Not on file    Food insecurity:     Worry: Not on file     Inability: Not on file    Transportation needs:     Medical: Not on file     Non-medical: Not on file   Tobacco Use    Smoking status: Former Smoker     Packs/day: 2.00     Years: 31.00     Pack years: 62.00     Types: Cigarettes     Last attempt to quit: 1988     Years since quittin.6    Smokeless tobacco: Never Used   Substance and Sexual Activity    Alcohol use: No     Frequency: Never    Drug use: No    Sexual activity: Not on file   Lifestyle    Physical activity:     Days per week: Not on file     Minutes per session: Not on file    Stress: Not on file   Relationships    Social connections:     Talks on phone: Not on file     Gets together: Not on file     Attends Scientologist service: Not on file     Active member of club or organization: Not on file     Attends meetings of clubs or organizations: Not on file     Relationship status: Not on file   Other Topics Concern    Not on file   Social History Narrative    Not on file         Vital Signs Range (Last 24H):  Temp:  [36.3 °C (97.3 °F)]   Pulse:  [78]   Resp:  [18]   BP: (135)/(70)   SpO2:  [96 %]       CBC: No results for input(s): WBC, RBC, HGB, HCT, PLT, MCV, MCH, MCHC in the last 72 hours.    CMP: No results for input(s): NA, K, CL, CO2, BUN, CREATININE, GLU, MG, PHOS, CALCIUM, ALBUMIN, PROT, ALKPHOS, ALT, AST, BILITOT in the last 72 hours.    INR  No results for input(s): PT, INR, PROTIME, APTT in the last 72 hours.        Diagnostic Studies:      EKD Echo:        Anesthesia Evaluation    I have reviewed the Patient Summary Reports.    I have reviewed the Nursing Notes.   I have reviewed the Medications.     Review of Systems  Anesthesia Hx:  No problems with previous Anesthesia    Social:  Former Smoker, Non-Smoker     Hematology/Oncology:  Hematology Normal   Oncology Normal     EENT/Dental:EENT/Dental Normal   Cardiovascular:  Cardiovascular Normal     Pulmonary:   COPD, mild    Musculoskeletal:  Musculoskeletal Normal    Neurological:  Neurology Normal    Dermatological:  Skin Normal        Physical Exam  General:  Well nourished    Airway/Jaw/Neck:  Airway Findings: Mouth Opening: Normal Tongue: Normal  General Airway Assessment: Adult  Mallampati: III  Improves to II with phonation.  TM Distance: Normal, at least 6 cm      Dental:  Dental Findings: In tact   Chest/Lungs:  Chest/Lungs Findings: Clear to auscultation     Heart/Vascular:  Heart Findings: Rate: Normal  Rhythm: Regular Rhythm  Sounds: Normal        Mental Status:  Mental Status Findings:  Cooperative, Alert and Oriented         Anesthesia Plan  Type of Anesthesia, risks & benefits discussed:  Anesthesia Type:  general  Patient's Preference:   Intra-op Monitoring Plan:   Intra-op Monitoring Plan Comments:   Post Op Pain Control Plan:   Post Op Pain Control Plan Comments:   Induction:   IV  Beta Blocker:         Informed Consent: Patient understands risks and agrees with Anesthesia plan.  Questions answered. Anesthesia consent signed with patient.  ASA Score: 3     Day of Surgery Review of History & Physical:            Ready For Surgery From Anesthesia Perspective.

## 2019-04-11 NOTE — [employer] PATIENT INSTRUCTIONS
Discharge Summary/Instructions after an Endoscopic Procedure  Patient Name: Marietta Rosales  Patient MRN: [id number]  Patient YOB: 1950  Thursday, April 11, 2019  Familia Montes De Oca MD  RESTRICTIONS:  During your procedure today, you received medications for sedation.  These   medications may affect your judgment, balance and coordination.  Therefore,   for 24 hours, you have the following restrictions:   - DO NOT drive a car, operate machinery, make legal/financial decisions,   sign important papers or drink alcohol.    ACTIVITY:  Today: no heavy lifting, straining or running due to procedural   sedation/anesthesia.  The following day: return to full activity including work.  DIET:  Eat and drink normally unless instructed otherwise.     TREATMENT FOR COMMON SIDE EFFECTS:  - Mild abdominal pain, nausea, belching, bloating or excessive gas:  rest,   eat lightly and use a heating pad.  - Sore Throat: treat with throat lozenges and/or gargle with warm salt   water.  - Because air was used during the procedure, expelling large amounts of air   from your rectum or belching is normal.  - If a bowel prep was taken, you may not have a bowel movement for 1-3 days.    This is normal.  SYMPTOMS TO WATCH FOR AND REPORT TO YOUR PHYSICIAN:  1. Abdominal pain or bloating, other than gas cramps.  2. Chest pain.  3. Back pain.  4. Signs of infection such as: chills or fever occurring within 24 hours   after the procedure.  5. Rectal bleeding, which would show as bright red, maroon, or black stools.   (A tablespoon of blood from the rectum is not serious, especially if   hemorrhoids are present.)  6. Vomiting.  7. Weakness or dizziness.  GO DIRECTLY TO THE NEAREST EMERGENCY ROOM IF YOU HAVE ANY OF THE FOLLOWING:      Difficulty breathing              Chills and/or fever over 101 F   Persistent vomiting and/or vomiting blood   Severe abdominal pain   Severe chest pain   Black, tarry stools   Bleeding- more than one  tablespoon   Any other symptom or condition that you feel may need urgent attention  Your doctor recommends these additional instructions:  If any biopsies were taken, your doctors clinic will contact you in 1 to 2   weeks with any results.  - Discharge patient to home.   - Resume previous diet.   - Continue present medications.   - Return to referring physician at appointment to be scheduled.  For questions, problems or results please call your physician - Familia Montes De Oca MD at Work:  [phone].  [facility], EMERGENCY ROOM PHONE NUMBER: [phone]  IF A COMPLICATION OR EMERGENCY SITUATION ARISES AND YOU ARE UNABLE TO REACH   YOUR PHYSICIAN - GO DIRECTLY TO THE EMERGENCY ROOM.  Familia Montes De Oca MD  4/11/2019 8:38:08 AM  This report has been verified and signed electronically.  [employer]

## 2019-04-11 NOTE — DISCHARGE INSTRUCTIONS
Endoscopic Ultrasound (EUS)    An endoscopic ultrasound (EUS) is a test to look at the inside of your gastrointestinal (GI) tract. It's commonly used to look for cancers or growths in the esophagus, stomach, pancreas, liver, and rectum. It can help to stage cancer (see how advanced a cancer is). EUS may also be used to help diagnose certain diseases or to drain cysts or abscesses.  What is EUS?  EUS shows both ultrasound images and live video of the GI tract. During the test, a flexible tube called an endoscope (scope) is used. At the end of the scope is a tiny video camera and light. The video camera sends live images to a monitor. The scope also contains a very small ultrasound device. This uses sound waves to create images and send them to a monitor.  A needle is passed through the scope. The needle can be used take a small sample of tissue for testing. This is called a biopsy. The needle can be used to take a sample of fluid. This is called fine-needle aspiration (FNA).  Risks and possible complications of EUS  Risks and possible complications include the following:  · Bleeding  · Infection  · A perforation (hole) in the digestive tract   · Risks of sedation or anesthesia   Before the test  Be prepared prior to the test:  · Tell your healthcare provider what medicine you take. This includes vitamins, herbs, and over-the-counter medicine. It also includes any blood thinners, such as warfarin, clopidogrel, ibuprofen, or daily aspirin. Ask your healthcare provider if you need to stop taking some or all of them before the test.  · You may be prescribed antibiotics to take before or after the test. This depends on the area being studied and what is done during the test. These medicines help prevent infection.  · Carefully follow the instructions for preparing for the test to make sure results are accurate. Instructions may include:  ¨ If youre having an EUS of the upper GI tract (esophagus, stomach, duodenum,  pancreas, liver):  § Do not eat or drink for 6 hours before the test.  ¨ If youre having an EUS of the lower GI tract (rectum):  § Before the test, do bowel prep as instructed to clean your rectum of stool. This may involve a clear liquid diet and using a laxative (liquid or pills) the night before the test. Or it may mean doing one or more enemas the morning of the test.  § Do not eat or drink for 6 hours before the test.  · Be sure to arrive on time at the facility. Bring your identification and health insurance card. Leave valuables at home. If you have them, bring X-rays or other test results with you.  Let the healthcare provider know  For your safety, tell the healthcare provider if you:  · Take insulin. Your dose may need to be changed on the day of your test.  · Are allergic to latex.  · Have any other allergies.  · Are taking blood thinners.   During the test  An endoscopic ultrasound usually takes place in a hospital. The procedure itself may take 1 to 2 hours. You will likely go home soon afterward. During the test:  · You lie on your left side on an exam table.  · An intravenous (IV) line will be put into a vein in your arm or hand. This line supplies fluids and medicines. To keep you comfortable during the test, you will be given a sedative medicine. This medicine prevents discomfort and will make you sleepy.  · If you are having an EUS of the upper GI tract, local anesthetic may be sprayed in your throat. This will help you be more comfortable as the healthcare provider inserts the scope. The healthcare provider then gently puts the flexible scope into your mouth or nose and down your throat.  · If youre having an EUS of the lower GI tract, the healthcare provider gently puts the flexible scope into your anus.  · During the test, the scope sends live video and ultrasound images from inside your body to nearby monitors. These are used to examine your GI tract. Specialized procedures, such as drainage,  are done as needed.  · The healthcare provider may discuss the results with you soon after the test. Biopsy results take several  days.  · In most cases, you can go home within a few hours of the test. When you leave the facility, have an adult family member or friend drive you, even if you don't feel that sleepy.  After the test  Here is what to expect after the test:  · You may feel tired from the sedative. This should wear off by the end of the day.  · If you had an upper digestive endoscopy, your throat may feel sore for a day or two. Over-the-counter sore throat lozenges and spray should help.  · You can eat and drink normally as soon as the test is done.  When to call the healthcare provider  Call your healthcare provider if you notice any of the following:  · Fever of 100.4°F (38.0°C) or higher, or as advised by your healthcare provider  · Shortness of breath  · Vomiting blood, blood in stool, or black stools  · Coughing or hoarse voice that wont go away   Date Last Reviewed: 7/1/2016  © 1064-3033 [employer]. [address] [place] [address]. All rights reserved. This information is not intended as a substitute for professional medical care. Always follow your healthcare professional's instructions.

## 2019-04-12 NOTE — ANESTHESIA POSTPROCEDURE EVALUATION
Anesthesia Post Evaluation    Patient: Marietta Rosales    Procedure(s) Performed: Procedure(s) (LRB):  ULTRASOUND, UPPER GI TRACT, ENDOSCOPIC (N/A)    Final Anesthesia Type: general  Patient location during evaluation: PACU  Patient participation: Yes- Able to Participate  Level of consciousness: awake and alert  Post-procedure vital signs: reviewed and stable  Pain management: adequate  Airway patency: patent  PONV status at discharge: No PONV  Anesthetic complications: no      Cardiovascular status: blood pressure returned to baseline  Respiratory status: unassisted  Hydration status: euvolemic  Follow-up not needed.          Vitals Value Taken Time   /77 4/11/2019  9:02 AM   Temp 36.5 °C (97.7 °F) 4/11/2019  9:00 AM   Pulse 76 4/11/2019  9:07 AM   Resp 22 4/11/2019  9:06 AM   SpO2 98 % 4/11/2019  9:07 AM   Vitals shown include unvalidated device data.      No case tracking events are documented in the log.      Pain/Chauncey Score: Chauncey Score: 10 (4/11/2019  8:46 AM)

## 2019-04-12 NOTE — ANESTHESIA POSTPROCEDURE EVALUATION
Anesthesia Post Evaluation    Patient: Marietta Rosales    Procedure(s) Performed: Procedure(s) (LRB):  ULTRASOUND, UPPER GI TRACT, ENDOSCOPIC (N/A)    [facility] Anesthesia Post Op Evaluation      Vitals Value Taken Time   /77 4/11/2019  9:02 AM   Temp 36.5 °C (97.7 °F) 4/11/2019  9:00 AM   Pulse 76 4/11/2019  9:07 AM   Resp 22 4/11/2019  9:06 AM   SpO2 98 % 4/11/2019  9:07 AM   Vitals shown include unvalidated device data.      No case tracking events are documented in the log.      Pain/Chauncey Score: Chauncey Score: 10 (4/11/2019  8:46 AM)

## 2019-05-17 DIAGNOSIS — C34.11 MALIGNANT NEOPLASM OF UPPER LOBE OF RIGHT LUNG: Primary | ICD-10-CM

## 2019-07-06 DIAGNOSIS — F32.A DEPRESSION, UNSPECIFIED DEPRESSION TYPE: ICD-10-CM

## 2019-07-09 RX ORDER — DULOXETIN HYDROCHLORIDE 30 MG/1
CAPSULE, DELAYED RELEASE ORAL
Qty: 90 CAPSULE | Refills: 1 | Status: SHIPPED | OUTPATIENT
Start: 2019-07-09 | End: 2019-12-10

## 2019-07-09 RX ORDER — NORTRIPTYLINE HYDROCHLORIDE 75 MG/1
CAPSULE ORAL
Qty: 90 CAPSULE | Refills: 2 | Status: SHIPPED | OUTPATIENT
Start: 2019-07-09 | End: 2020-04-29 | Stop reason: SDUPTHER

## 2019-08-07 ENCOUNTER — TELEPHONE (OUTPATIENT)
Dept: NEPHROLOGY | Facility: CLINIC | Age: 69
End: 2019-08-07

## 2019-09-13 ENCOUNTER — OFFICE VISIT (OUTPATIENT)
Dept: HEMATOLOGY/ONCOLOGY | Facility: CLINIC | Age: 69
End: 2019-09-13
Payer: MEDICARE

## 2019-09-13 ENCOUNTER — LAB VISIT (OUTPATIENT)
Dept: LAB | Facility: HOSPITAL | Age: 69
End: 2019-09-13
Payer: MEDICARE

## 2019-09-13 VITALS
OXYGEN SATURATION: 100 % | DIASTOLIC BLOOD PRESSURE: 70 MMHG | HEIGHT: 64 IN | SYSTOLIC BLOOD PRESSURE: 140 MMHG | RESPIRATION RATE: 16 BRPM | WEIGHT: 157.44 LBS | TEMPERATURE: 99 F | BODY MASS INDEX: 26.88 KG/M2 | HEART RATE: 75 BPM

## 2019-09-13 DIAGNOSIS — K83.8 DILATED BILE DUCT: ICD-10-CM

## 2019-09-13 DIAGNOSIS — C34.11 MALIGNANT NEOPLASM OF UPPER LOBE OF RIGHT LUNG: ICD-10-CM

## 2019-09-13 DIAGNOSIS — K46.9 HERNIA OF ABDOMINAL CAVITY: Primary | ICD-10-CM

## 2019-09-13 DIAGNOSIS — Z45.2 ENCOUNTER FOR CARE RELATED TO VASCULAR ACCESS PORT: ICD-10-CM

## 2019-09-13 LAB
ALBUMIN SERPL BCP-MCNC: 4.2 G/DL (ref 3.5–5.2)
ALP SERPL-CCNC: 65 U/L (ref 55–135)
ALT SERPL W/O P-5'-P-CCNC: 13 U/L (ref 10–44)
ANION GAP SERPL CALC-SCNC: 9 MMOL/L (ref 8–16)
AST SERPL-CCNC: 20 U/L (ref 10–40)
BILIRUB SERPL-MCNC: 0.4 MG/DL (ref 0.1–1)
BUN SERPL-MCNC: 18 MG/DL (ref 8–23)
CALCIUM SERPL-MCNC: 9.4 MG/DL (ref 8.7–10.5)
CHLORIDE SERPL-SCNC: 104 MMOL/L (ref 95–110)
CO2 SERPL-SCNC: 25 MMOL/L (ref 23–29)
CREAT SERPL-MCNC: 1 MG/DL (ref 0.5–1.4)
ERYTHROCYTE [DISTWIDTH] IN BLOOD BY AUTOMATED COUNT: 12.8 % (ref 11.5–14.5)
EST. GFR  (AFRICAN AMERICAN): >60 ML/MIN/1.73 M^2
EST. GFR  (NON AFRICAN AMERICAN): 58 ML/MIN/1.73 M^2
GLUCOSE SERPL-MCNC: 96 MG/DL (ref 70–110)
HCT VFR BLD AUTO: 34.5 % (ref 37–48.5)
HGB BLD-MCNC: 10.5 G/DL (ref 12–16)
IMM GRANULOCYTES # BLD AUTO: 0 K/UL (ref 0–0.04)
MCH RBC QN AUTO: 30.2 PG (ref 27–31)
MCHC RBC AUTO-ENTMCNC: 30.4 G/DL (ref 32–36)
MCV RBC AUTO: 99 FL (ref 82–98)
NEUTROPHILS # BLD AUTO: 3.4 K/UL (ref 1.8–7.7)
PLATELET # BLD AUTO: 200 K/UL (ref 150–350)
PMV BLD AUTO: 9.2 FL (ref 9.2–12.9)
POTASSIUM SERPL-SCNC: 4.2 MMOL/L (ref 3.5–5.1)
PROT SERPL-MCNC: 6.9 G/DL (ref 6–8.4)
RBC # BLD AUTO: 3.48 M/UL (ref 4–5.4)
SODIUM SERPL-SCNC: 138 MMOL/L (ref 136–145)
WBC # BLD AUTO: 5.19 K/UL (ref 3.9–12.7)

## 2019-09-13 PROCEDURE — 80053 COMPREHEN METABOLIC PANEL: CPT

## 2019-09-13 PROCEDURE — 99999 PR PBB SHADOW E&M-EST. PATIENT-LVL IV: CPT | Mod: PBBFAC,,, | Performed by: INTERNAL MEDICINE

## 2019-09-13 PROCEDURE — 99999 PR PBB SHADOW E&M-EST. PATIENT-LVL IV: ICD-10-PCS | Mod: PBBFAC,,, | Performed by: INTERNAL MEDICINE

## 2019-09-13 PROCEDURE — 99215 OFFICE O/P EST HI 40 MIN: CPT | Mod: S$GLB,,, | Performed by: INTERNAL MEDICINE

## 2019-09-13 PROCEDURE — 1101F PT FALLS ASSESS-DOCD LE1/YR: CPT | Mod: CPTII,S$GLB,, | Performed by: INTERNAL MEDICINE

## 2019-09-13 PROCEDURE — 99215 PR OFFICE/OUTPT VISIT, EST, LEVL V, 40-54 MIN: ICD-10-PCS | Mod: S$GLB,,, | Performed by: INTERNAL MEDICINE

## 2019-09-13 PROCEDURE — 1101F PR PT FALLS ASSESS DOC 0-1 FALLS W/OUT INJ PAST YR: ICD-10-PCS | Mod: CPTII,S$GLB,, | Performed by: INTERNAL MEDICINE

## 2019-09-13 PROCEDURE — 36415 COLL VENOUS BLD VENIPUNCTURE: CPT

## 2019-09-13 PROCEDURE — 85027 COMPLETE CBC AUTOMATED: CPT

## 2019-09-13 NOTE — PROGRESS NOTES
Hematology and Medical Oncology   Follow Up     09/13/2019    Primary Oncologic Diagnosis:Bronchogenic Carcinoma    History of Present Ilness:   Marietta Rosales (Marietta) is a pleasant 69 y.o.female who recently moved from the [place] area to [place] and is establishing care at [facility].    Ms. Rosales is an exceptionally pleasant female, who remains active, walking several miles daily. She is about to take in her niece to finish raising her. In 2015 she was diagnosed and treated for stage II adenocarcinoma of the lung.    Oncology History:  --Diagnosed with Stage II (p2T2N0) adenocarcinoma in the spring of 2015 when a right upper lobe nodule was seen on CT in January 2015 and was persistent at 0.8cm in April 2015.  --Underwent a right upper lobectomy on 5/6/15  --Completed 4 cycles of Cisplatin/Pemetrexed in early September 2105.  --All surveillance scans have been disease free  --Brain MRI on 11/9/18 No evidence of intracranial metastatic disease.  --PET on 2/23/19 No FDG PET/CT findings to suggest recurrent or metastatic disease.Marked dilatation of the intrahepatic and extrahepatic bile ducts, with the common bile duct measuring up to 2.3 cm.  Recommend correlation with prior imaging and further evaluation may be warranted.    Interval History:  Ms. Rosales is doing fair. She is under a substantial amount of stress due to family issues. She continues to have problems with a central bulge in her abdomen, which was recently evaluated by MRI and EGD.     PAST MEDICAL HISTORY:   Past Medical History:   Diagnosis Date    Anemia     Cancer     Dilated bile duct     GERD (gastroesophageal reflux disease)     Hypothyroid     Lung cancer        PAST SURGICAL HISTORY:   Past Surgical History:   Procedure Laterality Date    LUNG REMOVAL, PARTIAL      NC REMOVAL OF LUNG,LOBECTOMY Right     Lobectomy    REMOVAL, CATHETER, CENTRAL VENOUS, TUNNELED, WITH PORT N/A 3/19/2019    Performed by [facility] Diagnostic Provider  at [facility] OR 2ND FLR    ULTRASOUND, UPPER GI TRACT, ENDOSCOPIC N/A 4/11/2019    Performed by Familia Montes De Oca MD at [facility] ENDO (2ND FLR)       PAST SOCIAL HISTORY:   reports that she quit smoking about 31 years ago. Her smoking use included cigarettes. She has a 62.00 pack-year smoking history. She has never used smokeless tobacco. She reports that she does not drink alcohol or use drugs.    FAMILY HISTORY:  Family History   Problem Relation Age of Onset    Alcohol abuse Mother     Hypertension Mother     Alcohol abuse Father     Asthma Sister     Breast cancer Sister     Alcohol abuse Brother        CURRENT MEDICATIONS:   Current Outpatient Medications   Medication Sig    DULoxetine (CYMBALTA) 30 MG capsule TAKE 1 CAPSULE(30 MG) BY MOUTH EVERY DAY    levothyroxine (SYNTHROID) 50 MCG tablet Take 1 tablet (50 mcg total) by mouth once daily.    meclizine (ANTIVERT) 25 mg tablet Take 1 tablet (25 mg total) by mouth 3 (three) times daily as needed for Dizziness.    nortriptyline (PAMELOR) 75 MG Cap TAKE 1 CAPSULE BY MOUTH EVERY DAY    pantoprazole (PROTONIX) 20 MG tablet Take 1 tablet (20 mg total) by mouth once daily.     No current facility-administered medications for this visit.      ALLERGIES:   Review of patient's allergies indicates:  No Known Allergies      Review of Systems:     Review of Systems   Constitutional: Negative for appetite change, chills, diaphoresis, fatigue, fever and unexpected weight change.   HENT:   Negative for hearing loss, mouth sores, nosebleeds, sore throat, trouble swallowing and voice change.    Eyes: Negative for eye problems and icterus.   Respiratory: Negative for chest tightness, cough, hemoptysis, shortness of breath and wheezing.    Cardiovascular: Negative for chest pain, leg swelling and palpitations.   Gastrointestinal: Negative for abdominal distention, abdominal pain, blood in stool, diarrhea, nausea and vomiting.   Endocrine: Negative for hot flashes.    Genitourinary: Negative for bladder incontinence, difficulty urinating, dysuria and hematuria.    Musculoskeletal: Positive for arthralgias. Negative for back pain, flank pain, gait problem, myalgias, neck pain and neck stiffness.   Skin: Negative for itching, rash and wound.   Neurological: Negative for dizziness, extremity weakness, gait problem, headaches, numbness, seizures and speech difficulty.   Hematological: Negative for adenopathy. Does not bruise/bleed easily.   Psychiatric/Behavioral: Negative for confusion, depression and sleep disturbance. The patient is not nervous/anxious.           Physical Exam:     Vitals:    09/13/19 1114   BP: (!) 140/70   Pulse: 75   Resp: 16   Temp: 98.5 °F (36.9 °C)       Physical Exam   Constitutional: She is oriented to person, place, and time. She appears well-developed and well-nourished. No distress.   HENT:   Head: Normocephalic and atraumatic.   Mouth/Throat: Oropharynx is clear and moist. No oropharyngeal exudate.   Eyes: Pupils are equal, round, and reactive to light. Conjunctivae and EOM are normal.   Neck: Normal range of motion. Neck supple. No JVD present. No tracheal deviation present. No thyromegaly present.   Cardiovascular: Normal rate, regular rhythm and normal heart sounds. Exam reveals no friction rub.   No murmur heard.  Pulmonary/Chest: Effort normal. No stridor. No respiratory distress. She has no wheezes. She has no rales. She exhibits no tenderness.   decreased breath sounds on right   Abdominal: Soft. Bowel sounds are normal. She exhibits no distension. There is no tenderness. There is no rebound and no guarding.   Musculoskeletal: Normal range of motion. She exhibits no edema, tenderness or deformity.   Lymphadenopathy:     She has no axillary adenopathy.   Neurological: She is alert and oriented to person, place, and time. She displays normal reflexes. No cranial nerve deficit or sensory deficit. She exhibits normal muscle tone. Coordination  normal.   Skin: Skin is warm and dry. Capillary refill takes less than 2 seconds. No rash noted. She is not diaphoretic. No erythema. No pallor.   Psychiatric: She has a normal mood and affect. Her behavior is normal. Judgment and thought content normal.       ECOG Performance Status: (foot note - ECOG PS provided by Eastern Cooperative Oncology Group) 0 - Asymptomatic    Karnofsky Performance Score:  90%- Able to Carry on Normal Activity: Minor Symptoms of Disease    Labs:   Lab Results   Component Value Date    WBC 4.63 03/19/2019    HGB 11.0 (L) 03/19/2019    HCT 35.3 (L) 03/19/2019     03/19/2019    CHOL 215 (H) 02/08/2019    TRIG 87 02/08/2019    HDL 67 02/08/2019    ALT 11 10/04/2018    AST 23 10/04/2018     10/04/2018    K 4.8 10/04/2018     10/04/2018    CREATININE 1.2 10/04/2018    BUN 18 10/04/2018    CO2 24 10/04/2018    TSH 1.799 02/26/2019    INR 0.9 03/19/2019         Imaging: Previous imaging has been reviewed   Assessment and Plan:     Ms. Rosales is a 69 year old female who is here for ongoing lung cancer monitoring    Adenocarcinoma of the Right Lung  --Stage II, treated with lobectomy and Cis/Pem x 4 at [facility]  --baseline PET and MRI brain show no evidence of disease  --Currently no active signs of lung cancer  --Pt has requested that her port be removed    Biliary Dilatation  --Seen on imaging and patient endorses symptoms  --Referral to GI based on ongoing complaints    30 minutes were spent face to face with the patient to discuss the disease, natural history, treatment options and survival statistics. I have provided the patient with an opportunity to ask questions and have all questions answered to her satisfaction.       she will return to clinic in 6 months or sooner if needed, but knows to call in the interim if symptoms change or should a problem arise.        Bria Colby MD  Hematology and Medical Oncology  Bone Marrow Transplant  [place]  [facility]

## 2019-10-21 ENCOUNTER — TELEPHONE (OUTPATIENT)
Dept: GASTROENTEROLOGY | Facility: CLINIC | Age: 69
End: 2019-10-21

## 2019-10-21 NOTE — TELEPHONE ENCOUNTER
MA contacted pt to let her know the appointment on 11/6 is the soonest we can get her in because clinic is all booked up. Pt verbalized understanding and said she's not in extreme pain and that 11/6 at 8 am will be fine.

## 2019-10-31 ENCOUNTER — OFFICE VISIT (OUTPATIENT)
Dept: GASTROENTEROLOGY | Facility: CLINIC | Age: 69
End: 2019-10-31
Payer: MEDICARE

## 2019-10-31 VITALS
WEIGHT: 157.63 LBS | BODY MASS INDEX: 26.91 KG/M2 | SYSTOLIC BLOOD PRESSURE: 124 MMHG | HEIGHT: 64 IN | HEART RATE: 79 BPM | DIASTOLIC BLOOD PRESSURE: 76 MMHG

## 2019-10-31 DIAGNOSIS — Z12.11 ENCOUNTER FOR SCREENING COLONOSCOPY: ICD-10-CM

## 2019-10-31 DIAGNOSIS — R10.9 ABDOMINAL DISCOMFORT: Primary | ICD-10-CM

## 2019-10-31 DIAGNOSIS — K59.00 CONSTIPATION, UNSPECIFIED CONSTIPATION TYPE: ICD-10-CM

## 2019-10-31 PROCEDURE — 99204 OFFICE O/P NEW MOD 45 MIN: CPT | Mod: S$GLB,,, | Performed by: NURSE PRACTITIONER

## 2019-10-31 PROCEDURE — 99204 PR OFFICE/OUTPT VISIT, NEW, LEVL IV, 45-59 MIN: ICD-10-PCS | Mod: S$GLB,,, | Performed by: NURSE PRACTITIONER

## 2019-10-31 PROCEDURE — 1100F PR PT FALLS ASSESS DOC 2+ FALLS/FALL W/INJURY/YR: ICD-10-PCS | Mod: CPTII,S$GLB,, | Performed by: NURSE PRACTITIONER

## 2019-10-31 PROCEDURE — 3288F PR FALLS RISK ASSESSMENT DOCUMENTED: ICD-10-PCS | Mod: CPTII,S$GLB,, | Performed by: NURSE PRACTITIONER

## 2019-10-31 PROCEDURE — 3288F FALL RISK ASSESSMENT DOCD: CPT | Mod: CPTII,S$GLB,, | Performed by: NURSE PRACTITIONER

## 2019-10-31 PROCEDURE — 99999 PR PBB SHADOW E&M-EST. PATIENT-LVL III: ICD-10-PCS | Mod: PBBFAC,,, | Performed by: NURSE PRACTITIONER

## 2019-10-31 PROCEDURE — 99999 PR PBB SHADOW E&M-EST. PATIENT-LVL III: CPT | Mod: PBBFAC,,, | Performed by: NURSE PRACTITIONER

## 2019-10-31 PROCEDURE — 1100F PTFALLS ASSESS-DOCD GE2>/YR: CPT | Mod: CPTII,S$GLB,, | Performed by: NURSE PRACTITIONER

## 2019-10-31 NOTE — LETTER
October 31, 2019      Bria Colby MD  [address]  [facility] [address]           [facility]  [address]  [facility] [address]  Phone: [phone]  Fax: [phone]          Patient: Marietta Rosales   MR Number: [id number]   YOB: 1950   Date of Visit: 10/31/2019       Dear Dr. Bria Colby:    Thank you for referring Marietta Rosales to me for evaluation. Attached you will find relevant portions of my assessment and plan of care.    If you have questions, please do not hesitate to call me. I look forward to following Marietta Rosales along with you.    Sincerely,    Christine Ruiz, TERRY    Enclosure  CC:  No Recipients    If you would like to receive this communication electronically, please contact [email or web address] or [phone] to request more information on [employer] access.    For providers and/or their staff who would like to refer a patient to [facility], please contact us through our one-stop-shop provider referral line, [facility] , at [phone].    If you feel you have received this communication in error or would no longer like to receive these types of communications, please e-mail [email or web address]

## 2019-10-31 NOTE — PROGRESS NOTES
[facility] Consultation Note    Reason for Consult:  The primary encounter diagnosis was Abdominal discomfort. Diagnoses of Constipation, unspecified constipation type and Encounter for screening colonoscopy were also pertinent to this visit.    PCP:   Jenn Edgar   [address] [facility] [address]    Referring MD:  Bria Colby Md  [address] [facility] [address] [place] [address]    HPI:  This is a 69 y.o. female here for evaluation of constipation and GERD. New patient.  S/p lung cancer. Remission for 4 years.   For the past year has had Some abd discomfort. Has 7 BM per week. Usually small type one to [place] type 2 BM. Then has one huge soft BM.  Takes a laxative and feels like she gets runny stools.   Very rare BRBPR. Hemorrhoids.  No melena  Vegetarian  If she eats rice she feels like it gets stuck and has regurgitation  When she is very constipated, she feels like she has more reflux.      ROS:  Constitutional: No fevers, chills, No unintentional weight loss  ENT: No allergies  CV: + chest pain  Pulm: No cough, +CHEUNG  Ophtho: No vision changes  GI: see HPI  Derm: No rash  Heme: No lymphadenopathy, No bruising  MSK: + Joint pain  : No dysuria, No hematuria  Neuro: No syncope, No seizure, + dizziness on occasion  Psych: No anxiety, + depression    Medical History:  has a past medical history of Anemia, Cancer, Dilated bile duct, GERD (gastroesophageal reflux disease), Hypothyroid, and Lung cancer.    Surgical History:  has a past surgical history that includes pr removal of lung,lobectomy (Right); Lung removal, partial; Mediport removal (N/A, 3/19/2019); and Endoscopic ultrasound of upper gastrointestinal tract (N/A, 4/11/2019).    Family History: family history includes Alcohol abuse in her brother, father, and mother; Asthma in her sister; Breast cancer in her sister; Esophageal cancer in her mother; Hypertension in her mother; Pancreatic cancer in her mother..     Social  "History:  reports that she quit smoking about 31 years ago. Her smoking use included cigarettes. She has a 62.00 pack-year smoking history. She has never used smokeless tobacco. She reports that she does not drink alcohol or use drugs.    Review of patient's allergies indicates:   Allergen Reactions    Codeine Nausea Only     Pt states she get stomach cramps when she take codeine.       Current Outpatient Medications on File Prior to Visit   Medication Sig Dispense Refill    DULoxetine (CYMBALTA) 30 MG capsule TAKE 1 CAPSULE(30 MG) BY MOUTH EVERY DAY 90 capsule 1    IBUPROFEN ORAL Take by mouth.      nortriptyline (PAMELOR) 75 MG Cap TAKE 1 CAPSULE BY MOUTH EVERY DAY 90 capsule 2    levothyroxine (SYNTHROID) 50 MCG tablet Take 1 tablet (50 mcg total) by mouth once daily. (Patient not taking: Reported on 10/31/2019) 30 tablet 11    meclizine (ANTIVERT) 25 mg tablet Take 1 tablet (25 mg total) by mouth 3 (three) times daily as needed for Dizziness. (Patient not taking: Reported on 10/31/2019) 30 tablet 3    pantoprazole (PROTONIX) 20 MG tablet Take 1 tablet (20 mg total) by mouth once daily. (Patient not taking: Reported on 10/31/2019) 30 tablet 11     No current facility-administered medications on file prior to visit.          Objective Findings:    Vital Signs:  /76 (BP Location: Right arm)   Pulse 79   Ht 5' 4" (1.626 m)   Wt 71.5 kg (157 lb 10.1 oz)   BMI 27.06 kg/m²   Body mass index is 27.06 kg/m².    Physical Exam:  General Appearance: Well appearing in no acute distress  Head:   Normocephalic, without obvious abnormality  Eyes:    No scleral icterus  ENT: Neck supple  Lungs: CTA bilaterally in anterior and posterior fields, no wheezes, no crackles.  Heart:  Regular rate and rhythm, S1, S2 normal, no murmurs heard  Abdomen: Soft,non distended with positive bowel sounds in all four quadrants. + R sided tenderness  Extremities: No edema  Skin: No rash  Neurologic: AAO x 3      Labs:  Lab Results "   Component Value Date    WBC 5.19 09/13/2019    HGB 10.5 (L) 09/13/2019    HCT 34.5 (L) 09/13/2019     09/13/2019    CHOL 215 (H) 02/08/2019    TRIG 87 02/08/2019    HDL 67 02/08/2019    ALT 13 09/13/2019    AST 20 09/13/2019     09/13/2019    K 4.2 09/13/2019     09/13/2019    CREATININE 1.0 09/13/2019    BUN 18 09/13/2019    CO2 25 09/13/2019    TSH 1.799 02/26/2019    INR 0.9 03/19/2019       Imaging reviewed:  MRI MRCP 3/2019  Status post cholecystectomy with intra and extrahepatic biliary dilatation.  No evidence for stricture or filling defect.  Further evaluation with biliary indices and ERCP may be of benefit.  Additionally, correlation with similar outside imaging would be helpful to evaluate for stability.    Endoscopy reviewed:    4/2019 EUS  - Normal esophagus.                        - Normal stomach.                        - Normal examined duodenum.                        - No specimens collected.    Assessment:    Mr. Rosales is a 69 y.o. WF with :     1. Abdominal discomfort    2. Constipation, unspecified constipation type    3. Encounter for screening colonoscopy      Recent EGD with EUS unremarkable stopping her symptoms are likely related to the constipation.    Recommendations:  1.  Labs to rule out celiac disease  2.  Screening colonoscopy  3.  MiraLax regimen    Follow up in about 6 weeks (around 12/12/2019).      Order summary:  Orders Placed This Encounter    Tissue transglutaminase, IgA    IgA    Case request GI: COLONOSCOPY         Thank you so much for allowing me to participate in the care of Marietta Rosales    DEMETRI Dillon

## 2019-10-31 NOTE — PATIENT INSTRUCTIONS
Start taking miralax one capful twice a day x 14 days. Mix with 8-10 oz of liquid. Then start taking it once nightly there after. Miralax is generally a safe medication and can be taken long term daily.     What is constipation?Constipation is a common problem that makes it hard to have bowel movements. Your bowel movements might be:  ?Too hard  ?Too small  ?Hard to get out  ?Happening fewer than 3 times a week  What causes constipation?Constipation can be caused by:  ?Side effects of some medicines  ?Poor diet  ?Diseases of the digestive system   What other symptoms should I watch for?These symptoms could signal a more serious problem:  ?Blood in the toilet or on the toilet paper after having a bowel movement  ?Fever  ?Weight loss  ?Feeling weak  Is there anything I can do on my own to get rid of constipation?Yes. Try these steps:  ?Eat foods that have a lot of fiber. Good choices are fruits, vegetables, prune juice, and cereal  ?Drink plenty of water and other fluids.  ?When you feel the need to go to the bathroom, go to the bathroom. Don't hold it.

## 2019-11-06 ENCOUNTER — TELEPHONE (OUTPATIENT)
Dept: ENDOSCOPY | Facility: HOSPITAL | Age: 69
End: 2019-11-06

## 2019-11-06 NOTE — TELEPHONE ENCOUNTER
Contacted Pt to schedule colonoscopy, Pt is not ready to schedule at this time,Pt to call back to schedule, number provided [phone].

## 2019-11-08 ENCOUNTER — HOSPITAL ENCOUNTER (EMERGENCY)
Facility: OTHER | Age: 69
Discharge: HOME | End: 2019-11-08
Attending: EMERGENCY MEDICINE
Payer: MEDICARE

## 2019-11-08 VITALS
SYSTOLIC BLOOD PRESSURE: 196 MMHG | DIASTOLIC BLOOD PRESSURE: 88 MMHG | WEIGHT: 160 LBS | RESPIRATION RATE: 20 BRPM | TEMPERATURE: 98 F | BODY MASS INDEX: 27.31 KG/M2 | HEIGHT: 64 IN | OXYGEN SATURATION: 100 % | HEART RATE: 86 BPM

## 2019-11-08 DIAGNOSIS — S62.647A CLOSED NONDISPLACED FRACTURE OF PROXIMAL PHALANX OF LEFT LITTLE FINGER, INITIAL ENCOUNTER: ICD-10-CM

## 2019-11-08 DIAGNOSIS — S62.367A CLOSED NONDISPLACED FRACTURE OF NECK OF FIFTH METACARPAL BONE OF LEFT HAND, INITIAL ENCOUNTER: ICD-10-CM

## 2019-11-08 DIAGNOSIS — S03.2XXA AVULSED TOOTH, INITIAL ENCOUNTER: Primary | ICD-10-CM

## 2019-11-08 DIAGNOSIS — S62.645A CLOSED NONDISPLACED FRACTURE OF PROXIMAL PHALANX OF LEFT RING FINGER, INITIAL ENCOUNTER: ICD-10-CM

## 2019-11-08 DIAGNOSIS — S62.365A CLOSED NONDISPLACED FRACTURE OF NECK OF FOURTH METACARPAL BONE OF LEFT HAND, INITIAL ENCOUNTER: ICD-10-CM
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
## 2019-11-08 PROCEDURE — 90715 TDAP VACCINE 7 YRS/> IM: CPT | Performed by: PHYSICIAN ASSISTANT

## 2019-11-08 PROCEDURE — 25000003 PHARM REV CODE 250: Performed by: PHYSICIAN ASSISTANT

## 2019-11-08 PROCEDURE — 29125 APPL SHORT ARM SPLINT STATIC: CPT | Mod: LT

## 2019-11-08 PROCEDURE — 90471 IMMUNIZATION ADMIN: CPT | Performed by: PHYSICIAN ASSISTANT

## 2019-11-08 PROCEDURE — 99284 EMERGENCY DEPT VISIT MOD MDM: CPT | Mod: 25

## 2019-11-08 PROCEDURE — 63600175 PHARM REV CODE 636 W HCPCS: Performed by: PHYSICIAN ASSISTANT

## 2019-11-08 RX ORDER — HYDROCODONE BITARTRATE AND ACETAMINOPHEN 5; 325 MG/1; MG/1
1 TABLET ORAL EVERY 6 HOURS PRN
Qty: 8 TABLET | Refills: 0 | Status: SHIPPED | OUTPATIENT
Start: 2019-11-08 | End: 2019-12-10

## 2019-11-08 RX ORDER — ONDANSETRON 4 MG/1
4 TABLET, ORALLY DISINTEGRATING ORAL EVERY 6 HOURS PRN
Qty: 8 TABLET | Refills: 0 | Status: SHIPPED | OUTPATIENT
Start: 2019-11-08 | End: 2019-12-10

## 2019-11-08 RX ORDER — ONDANSETRON 4 MG/1
4 TABLET, ORALLY DISINTEGRATING ORAL
Status: COMPLETED | OUTPATIENT
Start: 2019-11-08 | End: 2019-11-08

## 2019-11-08 RX ORDER — HYDROCODONE BITARTRATE AND ACETAMINOPHEN 5; 325 MG/1; MG/1
1 TABLET ORAL
Status: COMPLETED | OUTPATIENT
Start: 2019-11-08 | End: 2019-11-08

## 2019-11-08 RX ADMIN — HYDROCODONE BITARTRATE AND ACETAMINOPHEN 1 TABLET: 5; 325 TABLET ORAL at 05:11

## 2019-11-08 RX ADMIN — CLOSTRIDIUM TETANI TOXOID ANTIGEN (FORMALDEHYDE INACTIVATED), CORYNEBACTERIUM DIPHTHERIAE TOXOID ANTIGEN (FORMALDEHYDE INACTIVATED), BORDETELLA PERTUSSIS TOXOID ANTIGEN (GLUTARALDEHYDE INACTIVATED), BORDETELLA PERTUSSIS FILAMENTOUS HEMAGGLUTININ ANTIGEN (FORMALDEHYDE INACTIVATED), BORDETELLA PERTUSSIS PERTACTIN ANTIGEN, AND BORDETELLA PERTUSSIS FIMBRIAE 2/3 ANTIGEN 0.5 ML: 5; 2; 2.5; 5; 3; 5 INJECTION, SUSPENSION INTRAMUSCULAR at 04:11

## 2019-11-08 RX ADMIN — ONDANSETRON 4 MG: 4 TABLET, ORALLY DISINTEGRATING ORAL at 05:11

## 2019-11-08 NOTE — ED PROVIDER NOTES
Encounter Date: 11/8/2019       History     Chief Complaint   Patient presents with    Fall     Pt c/o left hand pain, finger dislocation, & upper lip lac & loss of front tooth & incisor. Pt reports falling on uneven side walk, falling onto her face. Denies LOC & taking blood thinners.     Patient is a 69 y.o. female with a past medical history of anemia, lung cancer, presenting to the emergency department for evaluation status post fall.  The patient states that earlier this afternoon at approximately 2:00 p.m., she was walking some dogs when she tripped on the sidewalk and fell.  She states that she fell face forward, and put her left arm out to break her fall.  She states that her right middle to gave out, she has it with her pocket.  She also states that she cut her lip, has pain in her left hand.  She denies any loss of consciousness.  No changes in her vision, nausea or vomiting. She is not up-to-date on tetanus.  She has not taken any medication for symptoms thus far.This is the extent of the patient's complaints at this time.       The history is provided by the patient.     Review of patient's allergies indicates:   Allergen Reactions    Codeine Nausea Only     Pt states she get stomach cramps when she take codeine.     Past Medical History:   Diagnosis Date    Anemia     Cancer     Dilated bile duct     GERD (gastroesophageal reflux disease)     Hypothyroid     Lung cancer      Past Surgical History:   Procedure Laterality Date    ENDOSCOPIC ULTRASOUND OF UPPER GASTROINTESTINAL TRACT N/A 4/11/2019    Procedure: ULTRASOUND, UPPER GI TRACT, ENDOSCOPIC;  Surgeon: Familia Montes De Oca MD;  Location: [facility] ([address] [place] [address]);  Service: Endoscopy;  Laterality: N/A;    LUNG REMOVAL, PARTIAL      MEDIPORT REMOVAL N/A 3/19/2019    Procedure: REMOVAL, CATHETER, CENTRAL VENOUS, TUNNELED, WITH PORT;  Surgeon: Sonya Diagnostic Provider;  Location: [facility] OR [address] [place] [address];  Service: Radiology;  Laterality: N/A;  [facility]/sand     DE REMOVAL OF LUNG,LOBECTOMY Right     Lobectomy     Family History   Problem Relation Age of Onset    Alcohol abuse Mother     Hypertension Mother     Esophageal cancer Mother     Pancreatic cancer Mother     Alcohol abuse Father     Asthma Sister     Breast cancer Sister     Alcohol abuse Brother     Celiac disease Neg Hx     Colon cancer Neg Hx     Colon polyps Neg Hx     Crohn's disease Neg Hx     Cystic fibrosis Neg Hx      Social History     Tobacco Use    Smoking status: Former Smoker     Packs/day: 2.00     Years: 31.00     Pack years: 62.00     Types: Cigarettes     Last attempt to quit: 1988     Years since quittin.2    Smokeless tobacco: Never Used   Substance Use Topics    Alcohol use: No     Frequency: Never    Drug use: No     Review of Systems   Constitutional: Negative for activity change, appetite change, chills, fatigue and fever.   HENT: Positive for dental problem. Negative for congestion, rhinorrhea and sore throat.    Eyes: Negative for photophobia and visual disturbance.   Respiratory: Negative for cough, shortness of breath and wheezing.    Cardiovascular: Negative for chest pain.   Gastrointestinal: Negative for abdominal pain, diarrhea, nausea and vomiting.   Genitourinary: Negative for dysuria, hematuria and urgency.   Musculoskeletal: Positive for arthralgias and joint swelling. Negative for back pain, myalgias and neck pain.   Skin: Positive for wound. Negative for color change.   Neurological: Negative for weakness and headaches.   Psychiatric/Behavioral: Negative for agitation and confusion.       Physical Exam     Initial Vitals [19 1456]   BP Pulse Resp Temp SpO2   137/65 78 20 98.4 °F (36.9 °C) 98 %      MAP       --         Physical Exam    Nursing note and vitals reviewed.  Constitutional: She appears well-developed and well-nourished. She is not diaphoretic. She is cooperative.  Non-toxic appearance. She does not have a sickly appearance. She does  not appear ill. No distress.   Well-appearing,  female unaccompanied the emergency room.  Speaking clearly in full sentences.  No acute distress.   HENT:   Head: Normocephalic and atraumatic.       Right Ear: External ear normal.   Left Ear: External ear normal.   Nose: Nose normal.   Mouth/Throat: Oropharynx is clear and moist.   Abrasion noted to the right outer lip.  Mild edema noted of the upper lip.  Full dental avulsion noted of the right central incisor.  Tenderness to palpation surrounding gingiva.  Left central incisor is loose with a possible crack.   Eyes: Conjunctivae and EOM are normal. Pupils are equal, round, and reactive to light.   Neck: Normal range of motion. Neck supple.   Cardiovascular: Normal rate, regular rhythm and normal heart sounds.   Pulmonary/Chest: Breath sounds normal. No respiratory distress. She has no wheezes.   Musculoskeletal: Normal range of motion.   Edema with tenderness to palpation ecchymosis noted of the left hand most significant along the ulnar aspect, pain with range of motion of the left 4th and 5th digits.  Normal capillary refill.  No abrasions or lacerations.  Good pulses at the wrist.  No significant tenderness to palpation of the 1st, 2nd, or 3rd digits.   Neurological: She is alert and oriented to person, place, and time. She has normal strength. No cranial nerve deficit or sensory deficit. GCS eye subscore is 4. GCS verbal subscore is 5. GCS motor subscore is 6.   Skin: Skin is warm.   Psychiatric: She has a normal mood and affect. Her behavior is normal. Judgment and thought content normal.         ED Course   Orthopedic Injury  Date/Time: 11/8/2019 6:37 PM  Performed by: Constance Diane PA-C  Authorized by: Hammad Foster II, MD     Injury:     Injury location:  Hand    Location details:  Left hand    Injury type:  Fracture (Fourth metacarpal, 5th metacarpal, 4th proximal phalanx, 5th proximal phalanx)      Pre-procedure assessment:     Neurovascular  status: Neurovascularly intact        Selections made in this section will also lock the Injury type section above.:     Immobilization:  Splint    Splint type:  Ulnar gutter    Supplies used:  Ortho-Glass  Post-procedure assessment:     Neurovascular status: Neurovascularly intact      Patient tolerance:  Patient tolerated the procedure well with no immediate complications      Labs Reviewed - No data to display       Imaging Results          CT Maxillofacial Without Contrast (Final result)  Result time 11/08/19 16:43:44    Final result by Josh Pina MD (11/08/19 16:43:44)                 Impression:      Upper lip and right facial suspected localized soft tissue swelling/contusion, with potential recent posttraumatic avulsion of the right maxillary central incisor tooth.      Electronically signed by: Josh Pina MD  Date:    11/08/2019  Time:    16:43             Narrative:    EXAMINATION:  CT MAXILLOFACIAL WITHOUT CONTRAST    CLINICAL HISTORY:  Maxface trauma blunt;    TECHNIQUE:  Low dose axial images, sagittal and coronal reformations were obtained through the face.  Contrast was not administered.    COMPARISON:  Head CT earlier same day    FINDINGS:  There is mild soft tissue prominence with subcutaneous stranding overlying the right maxilla as well as asymmetric prominence of the upper lip, suggesting localized edema/contusion in the setting of trauma.  No subcutaneous emphysema or radiodense retained foreign body.  Beam hardening with streak artifact from dental hardware somewhat limits evaluation.    Bilateral orbits are symmetric and within normal limits.  Both globes are intact.  Both ocular lenses are anteriorly located.  Bilateral orbital rims, zygomatic arches, pterygoid plates, mandibular condyles, TMJs and nasal bones appear relatively symmetric and intact.  Slight rightward deviation of the bony nasal septum which is otherwise intact.  Paranasal sinuses, middle ears and mastoid air cells are  clear.  Right maxillary central incisor is absent.  Additional more chronic appearing missing teeth at the bilateral posterior mandible and maxilla.    Included airway is midline and patent.  Degenerative changes of the imaged mid to upper cervical spine most prominent at C4-5 level.  Imaged intracranial contents are without acute process seen.                               CT Head Without Contrast (Final result)  Result time 11/08/19 16:25:14    Final result by Joshua Hwang MD (11/08/19 16:25:14)                 Impression:      No acute intracranial abnormality.      Electronically signed by: Joshua Hwang MD  Date:    11/08/2019  Time:    16:25             Narrative:    EXAMINATION:  CT HEAD WITHOUT CONTRAST    CLINICAL HISTORY:  Confusion/delirium, altered LOC, unexplained;    TECHNIQUE:  Low dose axial images were obtained through the head.  Coronal and sagittal reformations were also performed. Contrast was not administered.    COMPARISON:  11/09/2018    FINDINGS:  There is no midline shift, hydrocephalus or mass effect.  There is no evidence of acute intracranial hemorrhage or acute major vascular territory infarct.  No evidence of a space-occupying mass or abnormal extra-axial fluid collection.  Calvarium is intact.  Visualized paranasal sinuses and mastoid air cells are clear.                               X-Ray Hand 3 view Left (Final result)  Result time 11/08/19 15:16:31    Final result by Familia Lino MD (11/08/19 15:16:31)                 Impression:      See above      Electronically signed by: Familia Lino MD  Date:    11/08/2019  Time:    15:16             Narrative:    EXAMINATION:  XR HAND COMPLETE 3 VIEW LEFT    CLINICAL HISTORY:  fall;.    TECHNIQUE:  PA, lateral, and oblique views of the left hand were performed.    COMPARISON:  None    FINDINGS:  There are nondisplaced fractures identified involving the neck and head of the 4th and 5th metacarpal bone.  There is also evidence of fractures  involving the base of the 4th and 5th proximal phalanges.  Osteopenia.                              X-Rays:   Independently Interpreted Readings:   Other Readings:  X-ray left hand - nondisplaced fractures of the distal 4th and 5th metacarpals; nondisplaced fractures of the proximal phalanges at the bases. No dislocations.     Medical Decision Making:   Initial Assessment:     Urgent evaluation of a 69 y.o. female presenting to the emergency department for evaluation status post fall. Patient is afebrile, nontoxic appearing and hemodynamically stable. Physical exam reveals a right-sided central incisor avulsion with edema and tenderness to palpation ecchymosis of the left hand.  No focal neurological deficits or weaknesses.  Images ordered from provider in triage as well as a tetanus.    Clinical Tests:   Radiological Study: Ordered and Reviewed  ED Management:    CT head is normal. CT maxillofacial shows no acute fracture or dislocation, soft tissue injury noted and an avulsion of the right maxillary central incisor. X-ray of the left hand shows fractures of the 4th and 5th metacarpals and the 4th and 5th proximal phalanges.  Patient was placed in ulnar gutter splint per procedure note, tolerated this well.    I did contact and emergency dentist who is available to see the patient immediately. I explained that the tooth had been loose in the patient's pocket for about 3 hours.  He did not recommend attempting to place the tooth back and at this time but instead advised her to come straight to clinic.  The tooth was placed in milk, patient was given analgesics, advised to follow up immediately with dentist this evening.  She voiced understanding and went to follow up with Dr. Urbano.  She was discharged home in stable condition.The patient was instructed to follow up with a primary care provider in 2 days or to return to the emergency department for worsening symptoms. The treatment plan was discussed with the  patient who demonstrated understanding and comfort with plan. The patient's history, physical exam, and plan of care was discussed with and agreed upon with my supervising physician.        This note was created using [employer] Fluency Direct. There may be typographical errors secondary to dictation.                      ED Course as of Nov 08 1839 Fri Nov 08, 2019   1531 Marietta Rosales, 69 y.o.  presented to the ED complaining of reports left hand pain, finger dislocation, upper lip laceration and tooth avulsion after fall x 1 hour PTA. She reports that she has been wating a lot of gluten this week and this always makes her a little off, she reports that she has been feeling a little foggy and that could be why she tripped over the cracked sidewalk.     Patient seen and medically screened by myself in the Provider in Triage Sort system due to ED crowding.  Appropriate tests and/or medications ordered.  A medical screening exam has been performed.  The care will be assumed by myself or another provider when treatment space becomes available.  I am not assuming care of this patient at this time. 3:31 PM. BIANCA MARTIN        [AM]      ED Course User Index  [AM] Rosaline Urbano PA-C                Clinical Impression:     1. Avulsed tooth, initial encounter    2. Closed nondisplaced fracture of neck of fourth metacarpal bone of left hand, initial encounter    3. Closed nondisplaced fracture of neck of fifth metacarpal bone of left hand, initial encounter    4. Closed nondisplaced fracture of proximal phalanx of left ring finger, initial encounter    5. Closed nondisplaced fracture of proximal phalanx of left little finger, initial encounter    6. Fall, initial encounter           Disposition:   Disposition: Discharged  Condition: Stable                     Constance Diane PA-C  11/08/19 1839

## 2019-11-08 NOTE — ED TRIAGE NOTES
Patient presents to ER with c/o Left hand and finger pain, Top lip pain and lost of top tooth.  Pain 8/10.  Patient reports walking on the sidewalk in [place] around 3pm today, tripping and falling on face.  Pt states a good Religion stopped and brought her to the ER..

## 2019-11-12 ENCOUNTER — LAB VISIT (OUTPATIENT)
Dept: LAB | Facility: OTHER | Age: 69
End: 2019-11-12
Payer: MEDICARE

## 2019-11-12 DIAGNOSIS — R10.9 ABDOMINAL DISCOMFORT: ICD-10-CM

## 2019-11-12 DIAGNOSIS — K59.00 CONSTIPATION, UNSPECIFIED CONSTIPATION TYPE: ICD-10-CM

## 2019-11-12 LAB — IGA SERPL-MCNC: 97 MG/DL (ref 40–350)

## 2019-11-12 PROCEDURE — 83516 IMMUNOASSAY NONANTIBODY: CPT

## 2019-11-12 PROCEDURE — 82784 ASSAY IGA/IGD/IGG/IGM EACH: CPT

## 2019-11-15 LAB — TTG IGA SER-ACNC: 2 UNITS

## 2019-11-18 ENCOUNTER — TELEPHONE (OUTPATIENT)
Dept: GASTROENTEROLOGY | Facility: CLINIC | Age: 69
End: 2019-11-18

## 2019-11-18 NOTE — TELEPHONE ENCOUNTER
MA contacted pt to give lab results per Christine. Pt did not answer, MA left message for pt to give the clinic a call.      ----- Message from Christine Ruiz NP sent at 11/15/2019  8:47 PM CST -----  Celiac labs normal

## 2019-11-18 NOTE — TELEPHONE ENCOUNTER
MA contacted pt to give lab result per Christine. Pt verbalized understanding.         ----- Message from Beverley Tenorio sent at 11/18/2019  1:07 PM CST -----  Contact: Pt#[phone]  Calling in regards to missed call for results. Please call

## 2019-12-08 ENCOUNTER — PATIENT MESSAGE (OUTPATIENT)
Dept: GASTROENTEROLOGY | Facility: CLINIC | Age: 69
End: 2019-12-08

## 2019-12-09 ENCOUNTER — TELEPHONE (OUTPATIENT)
Dept: ENDOSCOPY | Facility: HOSPITAL | Age: 69
End: 2019-12-09

## 2019-12-09 NOTE — TELEPHONE ENCOUNTER
Received in basket message from patient requesting to schedule colonoscopy. Called patient. No answer. Left voicemail with number to call back.

## 2019-12-10 ENCOUNTER — TELEPHONE (OUTPATIENT)
Dept: INTERNAL MEDICINE | Facility: CLINIC | Age: 69
End: 2019-12-10

## 2019-12-10 ENCOUNTER — OFFICE VISIT (OUTPATIENT)
Dept: ORTHOPEDICS | Facility: CLINIC | Age: 69
End: 2019-12-10
Payer: MEDICARE

## 2019-12-10 ENCOUNTER — TELEPHONE (OUTPATIENT)
Dept: ORTHOPEDICS | Facility: CLINIC | Age: 69
End: 2019-12-10

## 2019-12-10 ENCOUNTER — HOSPITAL ENCOUNTER (OUTPATIENT)
Dept: RADIOLOGY | Facility: OTHER | Age: 69
Discharge: HOME | End: 2019-12-10
Attending: STUDENT IN AN ORGANIZED HEALTH CARE EDUCATION/TRAINING PROGRAM
Payer: MEDICARE

## 2019-12-10 ENCOUNTER — HOSPITAL ENCOUNTER (OUTPATIENT)
Dept: RADIOLOGY | Facility: OTHER | Age: 69
Discharge: HOME | End: 2019-12-10
Attending: ORTHOPAEDIC SURGERY
Payer: MEDICARE

## 2019-12-10 ENCOUNTER — OFFICE VISIT (OUTPATIENT)
Dept: INTERNAL MEDICINE | Facility: CLINIC | Age: 69
End: 2019-12-10
Attending: INTERNAL MEDICINE
Payer: MEDICARE

## 2019-12-10 VITALS
WEIGHT: 164.25 LBS | SYSTOLIC BLOOD PRESSURE: 110 MMHG | HEIGHT: 64 IN | OXYGEN SATURATION: 96 % | BODY MASS INDEX: 28.04 KG/M2 | HEART RATE: 95 BPM | DIASTOLIC BLOOD PRESSURE: 60 MMHG

## 2019-12-10 VITALS
HEART RATE: 86 BPM | BODY MASS INDEX: 28 KG/M2 | HEIGHT: 64 IN | SYSTOLIC BLOOD PRESSURE: 133 MMHG | WEIGHT: 164 LBS | DIASTOLIC BLOOD PRESSURE: 75 MMHG

## 2019-12-10 DIAGNOSIS — S62.92XA CLOSED FRACTURE OF LEFT HAND, INITIAL ENCOUNTER: Primary | ICD-10-CM

## 2019-12-10 DIAGNOSIS — S62.92XA CLOSED FRACTURE OF LEFT HAND, INITIAL ENCOUNTER: ICD-10-CM

## 2019-12-10 DIAGNOSIS — R52 PAIN: ICD-10-CM

## 2019-12-10 DIAGNOSIS — M81.0 AGE RELATED OSTEOPOROSIS, UNSPECIFIED PATHOLOGICAL FRACTURE PRESENCE: ICD-10-CM

## 2019-12-10 DIAGNOSIS — R52 PAIN: Primary | ICD-10-CM

## 2019-12-10 DIAGNOSIS — S62.615A CLOSED DISPLACED FRACTURE OF PROXIMAL PHALANX OF LEFT RING FINGER, INITIAL ENCOUNTER: ICD-10-CM

## 2019-12-10 DIAGNOSIS — S62.335A CLOSED DISPLACED FRACTURE OF NECK OF FOURTH METACARPAL BONE OF LEFT HAND, INITIAL ENCOUNTER: ICD-10-CM

## 2019-12-10 DIAGNOSIS — S62.617A CLOSED DISPLACED FRACTURE OF PROXIMAL PHALANX OF LEFT LITTLE FINGER, INITIAL ENCOUNTER: ICD-10-CM

## 2019-12-10 DIAGNOSIS — F33.40 RECURRENT MAJOR DEPRESSIVE DISORDER, IN REMISSION: ICD-10-CM

## 2019-12-10 DIAGNOSIS — S62.337A CLOSED DISPLACED FRACTURE OF NECK OF FIFTH METACARPAL BONE OF LEFT HAND, INITIAL ENCOUNTER: Primary | ICD-10-CM

## 2019-12-10 PROCEDURE — 1101F PT FALLS ASSESS-DOCD LE1/YR: CPT | Mod: CPTII,S$GLB,, | Performed by: INTERNAL MEDICINE

## 2019-12-10 PROCEDURE — 1101F PR PT FALLS ASSESS DOC 0-1 FALLS W/OUT INJ PAST YR: ICD-10-PCS | Mod: CPTII,S$GLB,, | Performed by: PHYSICIAN ASSISTANT

## 2019-12-10 PROCEDURE — 1159F PR MEDICATION LIST DOCUMENTED IN MEDICAL RECORD: ICD-10-PCS | Mod: S$GLB,,, | Performed by: INTERNAL MEDICINE

## 2019-12-10 PROCEDURE — 99499 UNLISTED E&M SERVICE: CPT | Mod: S$GLB,,, | Performed by: INTERNAL MEDICINE

## 2019-12-10 PROCEDURE — 1159F MED LIST DOCD IN RCRD: CPT | Mod: S$GLB,,, | Performed by: INTERNAL MEDICINE

## 2019-12-10 PROCEDURE — 1159F PR MEDICATION LIST DOCUMENTED IN MEDICAL RECORD: ICD-10-PCS | Mod: S$GLB,,, | Performed by: PHYSICIAN ASSISTANT

## 2019-12-10 PROCEDURE — 73130 X-RAY EXAM OF HAND: CPT | Mod: TC,FY,LT

## 2019-12-10 PROCEDURE — 1126F PR PAIN SEVERITY QUANTIFIED, NO PAIN PRESENT: ICD-10-PCS | Mod: S$GLB,,, | Performed by: INTERNAL MEDICINE

## 2019-12-10 PROCEDURE — 99999 PR PBB SHADOW E&M-EST. PATIENT-LVL IV: ICD-10-PCS | Mod: PBBFAC,,, | Performed by: INTERNAL MEDICINE

## 2019-12-10 PROCEDURE — 1126F AMNT PAIN NOTED NONE PRSNT: CPT | Mod: S$GLB,,, | Performed by: INTERNAL MEDICINE

## 2019-12-10 PROCEDURE — 99999 PR PBB SHADOW E&M-EST. PATIENT-LVL IV: CPT | Mod: PBBFAC,,, | Performed by: INTERNAL MEDICINE

## 2019-12-10 PROCEDURE — 99999 PR PBB SHADOW E&M-EST. PATIENT-LVL IV: ICD-10-PCS | Mod: PBBFAC,,, | Performed by: PHYSICIAN ASSISTANT

## 2019-12-10 PROCEDURE — 1125F AMNT PAIN NOTED PAIN PRSNT: CPT | Mod: S$GLB,,, | Performed by: PHYSICIAN ASSISTANT

## 2019-12-10 PROCEDURE — 1159F MED LIST DOCD IN RCRD: CPT | Mod: S$GLB,,, | Performed by: PHYSICIAN ASSISTANT

## 2019-12-10 PROCEDURE — 99999 PR PBB SHADOW E&M-EST. PATIENT-LVL IV: CPT | Mod: PBBFAC,,, | Performed by: PHYSICIAN ASSISTANT

## 2019-12-10 PROCEDURE — 1101F PT FALLS ASSESS-DOCD LE1/YR: CPT | Mod: CPTII,S$GLB,, | Performed by: PHYSICIAN ASSISTANT

## 2019-12-10 PROCEDURE — 73130 XR HAND COMPLETE 3 VIEW LEFT: ICD-10-PCS | Mod: 26,LT,, | Performed by: RADIOLOGY

## 2019-12-10 PROCEDURE — 97760 ORTHOTIC MGMT&TRAING 1ST ENC: CPT | Mod: GP,S$GLB,, | Performed by: PHYSICIAN ASSISTANT

## 2019-12-10 PROCEDURE — 1125F PR PAIN SEVERITY QUANTIFIED, PAIN PRESENT: ICD-10-PCS | Mod: S$GLB,,, | Performed by: PHYSICIAN ASSISTANT

## 2019-12-10 PROCEDURE — 97760 PR ORTHOTIC MGMT&TRAINJ INITIAL ENC EA 15 MINS: ICD-10-PCS | Mod: GP,S$GLB,, | Performed by: PHYSICIAN ASSISTANT

## 2019-12-10 PROCEDURE — 99203 PR OFFICE/OUTPT VISIT, NEW, LEVL III, 30-44 MIN: ICD-10-PCS | Mod: 25,S$GLB,, | Performed by: PHYSICIAN ASSISTANT

## 2019-12-10 PROCEDURE — 73130 X-RAY EXAM OF HAND: CPT | Mod: 26,LT,, | Performed by: RADIOLOGY

## 2019-12-10 PROCEDURE — 99203 OFFICE O/P NEW LOW 30 MIN: CPT | Mod: 25,S$GLB,, | Performed by: PHYSICIAN ASSISTANT

## 2019-12-10 PROCEDURE — 1101F PR PT FALLS ASSESS DOC 0-1 FALLS W/OUT INJ PAST YR: ICD-10-PCS | Mod: CPTII,S$GLB,, | Performed by: INTERNAL MEDICINE

## 2019-12-10 PROCEDURE — 99214 OFFICE O/P EST MOD 30 MIN: CPT | Mod: S$GLB,,, | Performed by: INTERNAL MEDICINE

## 2019-12-10 PROCEDURE — 99499 RISK ADDL DX/OHS AUDIT: ICD-10-PCS | Mod: S$GLB,,, | Performed by: INTERNAL MEDICINE

## 2019-12-10 PROCEDURE — 99214 PR OFFICE/OUTPT VISIT, EST, LEVL IV, 30-39 MIN: ICD-10-PCS | Mod: S$GLB,,, | Performed by: INTERNAL MEDICINE

## 2019-12-10 RX ORDER — DULOXETIN HYDROCHLORIDE 20 MG/1
20 CAPSULE, DELAYED RELEASE ORAL DAILY
Qty: 30 CAPSULE | Refills: 1 | Status: SHIPPED | OUTPATIENT
Start: 2019-12-10 | End: 2019-12-10 | Stop reason: SDUPTHER

## 2019-12-10 RX ORDER — DULOXETIN HYDROCHLORIDE 20 MG/1
20 CAPSULE, DELAYED RELEASE ORAL DAILY
Qty: 30 CAPSULE | Refills: 1 | Status: SHIPPED | OUTPATIENT
Start: 2019-12-10 | End: 2020-12-09

## 2019-12-10 NOTE — TELEPHONE ENCOUNTER
Spoke w/ hand clinic and they were able to get pt in today (12/10) at 3 pm but an xray is needed of pt fingers .  Order has been signed under written order guideline for: XR FINGER 2 OR MORE VIEWS LEFT

## 2019-12-10 NOTE — PROGRESS NOTES
Subjective:      Patient ID: Marietta Rosales is a 69 y.o. female.    Chief Complaint: Pain of the Left Hand      HPI  Marietta Rosales is a right hand dominant 69 y.o. female presenting today for follow up from the ED.  She sustained fractures to the 4th and 5th metacarpals as well as to the base of the 4th and 5th proximal phalanges.  There was a history of trauma- she fell on uneven sidewalk hitting her face and outstretched left hand.  Injury occurred on 11/8/19.  She reports that her pain has been tolerable, she has not required prescription pain medication in the past 3 weeks.  She was seen in the ED at [facility], she was placed in a ortho glass ulnar gutter splint with fingers in extension. She reports that due to the fall she was given a list of follow-up that she needed, she reports that this was difficult to manage which is why she is only presenting to the hand clinic 4 weeks from injury.  She denies any finger numbness or tingling.  She reports a history of a trauma to the left small finger at the age of 19, reports that this laceration caused decreased motion of the left small finger PIP and DIP joints.      Review of patient's allergies indicates:   Allergen Reactions    Codeine Nausea Only     Pt states she get stomach cramps when she take codeine.         Current Outpatient Medications   Medication Sig Dispense Refill    DULoxetine (CYMBALTA) 20 MG capsule Take 1 capsule (20 mg total) by mouth once daily. 30 capsule 1    IBUPROFEN ORAL Take by mouth.      meclizine (ANTIVERT) 25 mg tablet Take 1 tablet (25 mg total) by mouth 3 (three) times daily as needed for Dizziness. 30 tablet 3    nortriptyline (PAMELOR) 75 MG Cap TAKE 1 CAPSULE BY MOUTH EVERY DAY 90 capsule 2     No current facility-administered medications for this visit.        Past Medical History:   Diagnosis Date    Anemia     Cancer     Dilated bile duct     GERD (gastroesophageal reflux disease)     Hypothyroid     Lung cancer   "      Past Surgical History:   Procedure Laterality Date    ENDOSCOPIC ULTRASOUND OF UPPER GASTROINTESTINAL TRACT N/A 4/11/2019    Procedure: ULTRASOUND, UPPER GI TRACT, ENDOSCOPIC;  Surgeon: Familia Montes De Oca MD;  [facility] (2ND FLR);  Service: Endoscopy;  Laterality: N/A;    LUNG REMOVAL, PARTIAL      MEDIPORT REMOVAL N/A 3/19/2019    Procedure: REMOVAL, CATHETER, CENTRAL VENOUS, TUNNELED, WITH PORT;  Surgeon: Sonya Diagnostic Provider;  [facility];  Service: Radiology;  Laterality: N/A;  188/sandow    MS REMOVAL OF LUNG,LOBECTOMY Right     Lobectomy         Review of Systems:  Review of Systems   Constitution: Negative for chills and fever.   Skin: Negative for rash and suspicious lesions.   Musculoskeletal:        See HPI   Neurological: Negative for dizziness, headaches, light-headedness, numbness and paresthesias.   Psychiatric/Behavioral: Negative for depression. The patient is not nervous/anxious.          OBJECTIVE:     PHYSICAL EXAM:  Height: 5' 4" (162.6 cm) Weight: 74.4 kg (164 lb)  Vitals:    12/10/19 1546   BP: 133/75   Pulse: 86   Weight: 74.4 kg (164 lb)   Height: 5' 4" (1.626 m)   PainSc:   2     General    Vitals reviewed.  Constitutional: She is oriented to person, place, and time. She appears well-developed and well-nourished.   HENT:   Head: Normocephalic and atraumatic.   Neck: Normal range of motion.   Cardiovascular: Normal rate.    Pulmonary/Chest: Effort normal. No respiratory distress.   Neurological: She is alert and oriented to person, place, and time.   Psychiatric: She has a normal mood and affect. Her behavior is normal. Judgment and thought content normal.             Musculoskeletal:  Well-healed scars left small finger from prior trauma at age 19.  Mild ecchymosis noted volarly at the left small finger and ulnar aspect of the hand.  Mild edema noted left ulnar hand and fingers.  She is mildly tender to palpation over the proximal phalanx of the left small " and ring fingers, no discrete tenderness over the 4th and 5th metacarpals.  Decreased finger range of motion on the left, good motion on the right.  Decreased left wrist range of motion on the left due to pain. Neurovascularly intact-patient reports good equal sensation, good motor function, good capillary refill, 2+ radial pulses.    RADIOGRAPHS:  Left Hand XRay, 12/10/19  FINDINGS:  Partial cast is present obscuring some detail.  Patient probably has osteoporosis.  Recent fractures with minimal displacement are again identified at the distal ends of the 4th and 5th metacarpals and adjacent proximal ends of the 4th and 5th proximal phalanges.  No dislocation is identified.  No new fracture is seen.  Mild osteoarthritis is present at several joints.      Impression     Healing fractures at 4th and 5th  MCP joints as above.     Comments: I have personally reviewed the imaging and I agree with the above radiologist's report.    ASSESSMENT/PLAN:   Marietta was seen today for pain.    Diagnoses and all orders for this visit:    Closed displaced fracture of neck of fifth metacarpal bone of left hand, initial encounter  -     X-Ray Hand 3 View Left; Future    Closed displaced fracture of neck of fourth metacarpal bone of left hand, initial encounter    Closed displaced fracture of proximal phalanx of left little finger, initial encounter    Closed displaced fracture of proximal phalanx of left ring finger, initial encounter           - We talked at length about the anatomy and pathophysiology of   Encounter Diagnoses   Name Primary?    Closed displaced fracture of neck of fifth metacarpal bone of left hand, initial encounter Yes    Closed displaced fracture of neck of fourth metacarpal bone of left hand, initial encounter     Closed displaced fracture of proximal phalanx of left little finger, initial encounter     Closed displaced fracture of proximal phalanx of left ring finger, initial encounter        - discussed  patient's fractures, discussed her time immobilized in the ED splint.  X-rays reviewed with Dr. Jorge, recommend splinting and starting therapy.  - TKO splint applied (15 minutes spent preparing, fitting, and educating on brace)  - orders placed for OT  - follow-up in 4 weeks with x-ray  - no lifting or weight-bearing  - call with questions or concerns    Disclaimer: This note has been generated using voice-recognition software. There may be typographical errors that have been missed during proof-reading.

## 2019-12-10 NOTE — PROGRESS NOTES
"Subjective:   Patient ID: Marietta Rosales is a 69 y.o. female  Chief complaint:   Chief Complaint   Patient presents with    Fall       HPI   Pt here for ER f/u   Pcp: Jenn Edgar - pt new to me     requesting to schedule a new pt appt to est care with new provider in future as prior pcp no longer at [facility] and prefers [facility] location     anxiety and depression on cymbalta and nortryptyline, vertigo, subclinial hypothyroidism, osteoporosis, hx of stage 2 adenoCA dx spring 2015 at [facility] and s/p RU lobectomy 2015 with neg PET 2/2019    - in ER 11/8/2019 after tripped and fell 11/8/2019   - dx with nondisplaced fractures left hand   - CT head wnl   - eval emergently by dentist in ER and has since then    Able to move fingers and hurting less but due to f/u with hand specialist    Did f/u dentist     Anxiety and depression:   Reports doing well on nortriptyline.  She has weaned Cymbalta does and would like to decrease to 20 mg with plans to take every other day for few days and then stop   - symptoms stable and well controlled    Review of Systems    Objective:  Vitals:    12/10/19 0907   BP: 110/60   Pulse: 95   SpO2: 96%   Weight: 74.5 kg (164 lb 3.9 oz)   Height: 5' 4" (1.626 m)     Body mass index is 28.19 kg/m².    Physical Exam   Constitutional: She is oriented to person, place, and time. She appears well-developed and well-nourished. No distress.   HENT:   Head: Normocephalic and atraumatic.   Missing front tooth   Eyes: Conjunctivae and EOM are normal.   Neck: Normal range of motion. Neck supple.   Cardiovascular: Normal rate, regular rhythm and intact distal pulses.   Pulmonary/Chest: Effort normal and breath sounds normal.   Abdominal: Soft. Normal appearance and bowel sounds are normal.   Musculoskeletal: She exhibits no edema or tenderness.   Left hand and wrist in brace  Able to move left fingers  Sensation to light touch intact  Cap refill less than 2 seconds  No redness or increased warmth or cyanosis "   Neurological: She is alert and oriented to person, place, and time. She has normal strength. Gait normal.   Skin: Skin is warm, dry and intact. She is not diaphoretic. No cyanosis. Nails show no clubbing.   Psychiatric: She has a normal mood and affect. Her speech is normal and behavior is normal. Cognition and memory are normal.   Vitals reviewed.      Assessment:  1. Closed fracture of left hand, initial encounter    2. Recurrent major depressive disorder, in remission    3. Age related osteoporosis, unspecified pathological fracture presence        Plan:  Marietta was seen today for fall.    Diagnoses and all orders for this visit:    Closed fracture of left hand, initial encounter  -     Ambulatory referral/consult to Orthopedics; Future  -     Ambulatory referral/consult to Orthopedics; Future  Able to arrange appt today with hand specialist at 3pm   Xray ordered prior to this appt     Recurrent major depressive disorder, in remission  Stable   Wean cymbalta and cont nortriptyline as pt reports abbi thsi well and effective   No si/hi/kapoor    Other orders  -     DULoxetine (CYMBALTA) 20 MG capsule; Take 1 capsule (20 mg total) by mouth once daily.    Osteoporosis: intol of statins     Needs prolia arranged through pcp after fx healed   Needs immed f/u with ortho hand   Will have staff arrange f/u appt with Shakira Dunn or Parveen to [facility]   Topic Date Due    High Dose Statin  01/03/1972    Pneumococcal Vaccine (65+ High/Highest Risk) (2 of 2 - PCV13) 02/26/2020    Mammogram  02/08/2021    DEXA SCAN  03/11/2022    Lipid Panel  02/08/2024    Colonoscopy  09/10/2025    TETANUS VACCINE  11/08/2029    Hepatitis C Screening  Completed

## 2019-12-24 ENCOUNTER — CLINICAL SUPPORT (OUTPATIENT)
Dept: REHABILITATION | Facility: HOSPITAL | Age: 69
End: 2019-12-24
Payer: MEDICARE

## 2019-12-24 DIAGNOSIS — R29.898 DECREASED GRIP STRENGTH OF LEFT HAND: ICD-10-CM

## 2019-12-24 DIAGNOSIS — M79.642 HAND PAIN, LEFT: ICD-10-CM

## 2019-12-24 DIAGNOSIS — M25.60 RANGE OF MOTION DEFICIT: ICD-10-CM

## 2019-12-24 PROCEDURE — 97110 THERAPEUTIC EXERCISES: CPT

## 2019-12-24 PROCEDURE — 97165 OT EVAL LOW COMPLEX 30 MIN: CPT

## 2019-12-24 NOTE — PROGRESS NOTES
[facility] Occupational Therapy  Initial Hand Evaluation     Date: 12/24/2019  Name: Marietta Rosales  Clinic Number: [id number]    Medical Diagnosis: Left 4th/5th MCP and PP fractures, non op 11/8/2019  Therapy Diagnosis:   Encounter Diagnoses   Name Primary?    Range of motion deficit     Hand pain, left     Decreased  strength of left hand      Physician: Elisabeth Triana PA    Physician Orders: eval and treat     Surgical Procedure and Date: N/A   Evaluation Date: 12/24/2019    Plan of Care Certification Period: 2/24/2020  Date of Return to MD: 1/3/2020    Visit # / Visits authorized: 1 / 6  Insurance Authorization Period Expiration: 1/6/2020  FOTO  (VISIT 1)     Time In:11 am   Time Out: 1145 am   Total Billable Time:45 minutes    Precautions:  Standard, Lung Cancer (NO US)     Subjective     Involved Side:left   Dominant Side: right   Date of Onset: 11/8/2019  Mechanism of Injury: felll on uneven sidewalk hitting her face and landing on outstretched hand   History of Current Condition: Marietta Rosales is a right hand dominant 69 y.o. female presenting today for follow up from the ED.  She sustained fractures to the 4th and 5th metacarpals as well as to the base of the 4th and 5th proximal phalanges.  There was a history of trauma- she fell on uneven sidewalk hitting her face and outstretched left hand.  Injury occurred on 11/8/19.   Imaging:  See chart   Previous Therapy: none     Past Medical History/Physical Systems Review:   Marietta Rosales  has a past medical history of Anemia, Cancer, Dilated bile duct, GERD (gastroesophageal reflux disease), Hypothyroid, and Lung cancer.    Marietta Rosales  has a past surgical history that includes pr removal of lung,lobectomy (Right); Lung removal, partial; Mediport removal (N/A, 3/19/2019); and Endoscopic ultrasound of upper gastrointestinal tract (N/A, 4/11/2019).    Marietta has a current medication list which includes the following  prescription(s): duloxetine, ibuprofen, meclizine, and nortriptyline.    Review of patient's allergies indicates:   Allergen Reactions    Codeine Nausea Only     Pt states she get stomach cramps when she take codeine.        Patient's Goals for Therapy:  Regain full use of my hand     Pain:  Functional Pain Scale Rating 0-10:   2/10 on average  0/10 at best  4/10 at worst  Location: dorsal hand   Description: Aching  Aggravating Factors: movement   Easing Factors: rest and brace     Occupation:  Retired, Walks dogs   Working presently: Retired   Duties: n/a     Functional Limitations/Social History:    Previous functional status includes: Independent with all ADLs.     Current FunctionalStatus   Home/Living environment : lives with their family      Limitation of Functional Status as follows:   ADLs/IADLs:     - Feeding: IND     - Bathing/ Hygiene: IND    - Dressing/Grooming: difficulty holding hairbrush, pulling on shirt overhead     - Driving: limited use for gripping steering wheel     - writing / typing - limited use for typing     - /pinch - limited use for gripping, holding things, cooking, cleaning , making coffee, washing dishes. Gripping dog leash         Leisure: dog walking, boarding       Objective     Observation/Appearance:      Sensation:   Intact     Wound/Scar:   None     Edema. Measured in centimeters.   MP'S 18.7 CM   PPC 19. CM   PWC 25.8 CM    RING/SMALL   P1  6.4 CM  / 5.6 CM       Hand ROM. Measured in degrees.   Old tendon damage to DIP of small finger and laceration at volar MP level limited ROM prior to onset   GENERALIZED FINGER STIFFNESS ; OPP to RF      RING / /SMALL   MP 0/30    0/30   PIP 20/50 1/20   DIP 0/25  0/0    WRIST EXT/FLEX 35/65     Manual Muscle Testing     Special Tests:  NT     Strength (Dyanmometer) and Pinch Strength (Pinch Gauge)  Measured in pounds and psi. Average of three trials.  TBA       Focus on Therapeutic Outcomes (FOTO) Symptom Scale: Patient score  indicates self perception of 68%  impairment, limitation or restriction of function upon initial assessment.     Treatment     Treatment Time In: 1120  Treatment Time Out: 1145  Total Treatment time separate from Evaluation time:25    Marietta received the following supervised modalities after being cleared for contradictions for 10 minutes:   -Patient received paraffin bath to left  hand(s) for 10 minutes to increase blood flow, circulation, pain management and for tissue elasticity prior to therex.     Marietta received therapeutic exercises for 15 minutes including:  - blocking FDP, FDS, isolated FDS glide, hook, wave, flat and composite fist, digit ab/ad and digit extension x 10 reps each   - active wrist flex, ext, RD, UD x 10 reps each, 3-5 x daily   - reviewed modality use for pain, edema  - adjusted orthosis to increase MP flexion. Encouraged use with activity , remove for bathing, hygiene, HEP       Home Exercise Program/Education:  Issued HEP (see patient instructions in EMR) and educated on  use of hot/cold modality use for pain, stiffness and edema management . Exercises were reviewed and Marietta was able to demonstrate them prior to the end of the session.   Pt received a written copy of exercises to perform at home. Marietta demonstrated good  understanding of the education provided.  Pt was advised to perform these exercises with minimal pain/discomfort of 3-4 out of 10 at worse, discontinue if pain worsens.    Patient/Family Education: role of OT, goals for OT, scheduling/cancellations - pt verbalized understanding. Discussed insurance limitations with patient.    Additional Education provided: per above    Assessment     Marietta Rosales is a 69 y.o. year old referred to outpatient occupational therapy and presents with a medical diagnosis of left MCP fx 4th/5th , resulting in Decreased ROM, Decreased  strength, Decreased muscle strength, Decreased functional hand use, Increased pain, Edema and Joint  Stiffness and demonstrates limitations as described in the chart below.   Following medical record review it is determined that pt will benefit from occupational therapy services in order to maximize pain free and/or functional use of left hand/UE   The following goals were discussed with the patient and patient is in agreement with them as to be addressed in the treatment plan. The patient's rehab potential is Good.     Anticipated barriers to occupational therapy: None   Pt has no cultural, educational or language barriers to learning provided.    Profile and History Assessment of Occupational Performance Level of Clinical Decision Making Complexity Score   Occupational Profile:   Marietta Rosales is a 69 y.o. female who lives with their family and is currently Retired . Marietta Rosales has difficulty with  bathing, grooming and dressing  driving/transportation management, phone/computer use and housework/household chores  affecting his/her daily functional abilities. His/her main goal for therapy is .     Comorbidities:    Anemia, Cancer, Dilated bile duct, GERD (gastroesophageal reflux disease), Hypothyroid, and Lung cancer.    Medical and Therapy History Review:   Brief               Performance Deficits    Physical:  Joint Mobility  Joint Stability  Muscle Power/Strength  Muscle Endurance  Edema   Strength  Gross Motor Coordination  Pain    Cognitive:  No Deficits    Psychosocial:    Habits  Routines     Clinical Decision Making:  low    Assessment Process:  Problem-Focused Assessments    Modification/Need for Assistance:  Not Necessary    Intervention Selection:  Limited Treatment Options       Low   Based on PMHX, co morbidities , data from assessments and functional level of assistance required with task and clinical presentation directly impacting function.       The following goals were discussed with the patient and patient is in agreement with them as to be addressed in the treatment plan.       Goals:    Short Term Goals (4 weeks)   1)  Patient to be IND with HEP and modalities for pain management  2)  Increase ROM 5-9 degrees to increase functional hand use for ADLs/work/leisure activities  3)   Decrease edema .1-.3 mm to increase joint mobility /flexibility for functional hand use.   4)  Assess  set goals accordingly   5)  Patient to score at  40-65%  or less on FOTO to demonstrate improved perception of functional left hand  Use.      Long Term Goals (8 weeks)   1)  Increase  strength 2-8 lbs. For gripping steering wheel for driving and for home chores   2) Patient to score at  30-40%  or less on FOTO to demonstrate improved perception of functional left hand  Use.  3)  Increase ROM 10-20  degrees to increase functional hand use for ADLs/work/leisure activities      Plan   Recommend continued Outpatient Occupataional Therapy  1x week for 8 weeks to include the following interventions Paraffin, Manual therapy/joint mobilizations, Modalities for pain management, US 3 mhz, Therapeutic exercises/activities., Strengthening, Edema Control, Scar Management, Wound Care and Electrical Modalities.    Within the Certification Period/Plan of care expiration: 12/24/2019 to 2/24/2020    I certify the need for these services furnished under the plan of treatment and while under my care.     ____________________________________________________________________  Physician/Referring Practitioner   Date of Signature   .      Rosemarie Mayo OT, CHT

## 2019-12-24 NOTE — PATIENT INSTRUCTIONS
"[facility] - OCCUPATIONAL THERAPY  HOME EXERCISE PROGRAM     SOAK HAND IN HOT WATER OR USE HOT WET COMPRESS FOR 5-10 MIN BEFORE EXERCISES OR AS NEEDED FOR PAIN/STIFFNESS.       Complete the following exercises with 10 repetitions each, 3-4x/day.     AROM: DIP Flexion / Extension  Pinch middle knuckle to prevent bending. Bend end knuckle until stretch is felt.   Hold 3 seconds. Relax. Straighten finger as far as possible.--AVOID FOR SMALL FINGER DUE TO PRIOR INJURY    AROM: PIP Flexion / Extension  Pinch bottom knuckle  to prevent bending. Actively bend middle knuckle until stretch is felt.   Hold 3 seconds. Relax. Straighten finger as far as possible.    AROM: Isolated PIP Flexion  Bend only middle joint of your finger, keeping other fingers straight with other hand.    AROM: Isolated MCP Flexion / Extension ("Wave")   Bend only your large, bottom knuckles. Hold 3 seconds. Keep the tips of your fingers straight. Straighten fingers.    AROM: Isolated IPJ Flexion / Extension ("Hook")  Bend only your middle and end knuckles. Hold 3 seconds.   Straighten your fingers.     AROM: MCP and PIP Flexion / Extension ("Straight Fist")  Bend your bottom and middle knuckles, keeping the tips of your fingers straight. Try to touch the pads of your fingers on your palm. Hold 3 seconds. Straighten your fingers.     AROM: Composite Flexion / Extension ("Full Fist")  Bend every joint in your hand into a fist. Hold 3 seconds. Straighten your fingers.     AROM: Composte Extension ("Finger Lifts")  Lift your finger off of the table one at a time. Hold 3 seconds. Relax your finger.    AROM: Abduction / Adduction  With hand flat on table, spread all fingers apart, then bring them together as close as possible.    Copyright © I. All rights reserved.     Therapist: CHRISTOPHE Preciado, EZEKIEL    "

## 2020-01-02 ENCOUNTER — TELEPHONE (OUTPATIENT)
Dept: ORTHOPEDICS | Facility: CLINIC | Age: 70
End: 2020-01-02

## 2020-01-02 ENCOUNTER — CLINICAL SUPPORT (OUTPATIENT)
Dept: REHABILITATION | Facility: HOSPITAL | Age: 70
End: 2020-01-02
Payer: MEDICARE

## 2020-01-02 DIAGNOSIS — M79.642 HAND PAIN, LEFT: ICD-10-CM

## 2020-01-02 DIAGNOSIS — M25.60 RANGE OF MOTION DEFICIT: ICD-10-CM

## 2020-01-02 DIAGNOSIS — R29.898 DECREASED GRIP STRENGTH OF LEFT HAND: ICD-10-CM

## 2020-01-02 PROCEDURE — 97110 THERAPEUTIC EXERCISES: CPT

## 2020-01-02 PROCEDURE — 97140 MANUAL THERAPY 1/> REGIONS: CPT

## 2020-01-02 PROCEDURE — 97018 PARAFFIN BATH THERAPY: CPT | Mod: 59

## 2020-01-03 ENCOUNTER — OFFICE VISIT (OUTPATIENT)
Dept: ORTHOPEDICS | Facility: CLINIC | Age: 70
End: 2020-01-03
Attending: INTERNAL MEDICINE
Payer: MEDICARE

## 2020-01-03 ENCOUNTER — HOSPITAL ENCOUNTER (OUTPATIENT)
Dept: RADIOLOGY | Facility: OTHER | Age: 70
Discharge: HOME | End: 2020-01-03
Attending: PHYSICIAN ASSISTANT
Payer: MEDICARE

## 2020-01-03 VITALS
WEIGHT: 164 LBS | BODY MASS INDEX: 28 KG/M2 | HEART RATE: 80 BPM | SYSTOLIC BLOOD PRESSURE: 118 MMHG | DIASTOLIC BLOOD PRESSURE: 78 MMHG | HEIGHT: 64 IN

## 2020-01-03 DIAGNOSIS — S62.337A CLOSED DISPLACED FRACTURE OF NECK OF FIFTH METACARPAL BONE OF LEFT HAND, INITIAL ENCOUNTER: ICD-10-CM

## 2020-01-03 DIAGNOSIS — S62.92XA CLOSED FRACTURE OF LEFT HAND, INITIAL ENCOUNTER: ICD-10-CM

## 2020-01-03 DIAGNOSIS — S62.617A CLOSED DISPLACED FRACTURE OF PROXIMAL PHALANX OF LEFT LITTLE FINGER, INITIAL ENCOUNTER: ICD-10-CM

## 2020-01-03 DIAGNOSIS — S62.335A CLOSED DISPLACED FRACTURE OF NECK OF FOURTH METACARPAL BONE OF LEFT HAND, INITIAL ENCOUNTER: ICD-10-CM

## 2020-01-03 DIAGNOSIS — S62.337A CLOSED DISPLACED FRACTURE OF NECK OF FIFTH METACARPAL BONE OF LEFT HAND, INITIAL ENCOUNTER: Primary | ICD-10-CM

## 2020-01-03 DIAGNOSIS — S62.615A CLOSED DISPLACED FRACTURE OF PROXIMAL PHALANX OF LEFT RING FINGER, INITIAL ENCOUNTER: ICD-10-CM

## 2020-01-03 PROCEDURE — 1159F MED LIST DOCD IN RCRD: CPT | Mod: S$GLB,,, | Performed by: PHYSICIAN ASSISTANT

## 2020-01-03 PROCEDURE — 73130 X-RAY EXAM OF HAND: CPT | Mod: TC,FY,LT

## 2020-01-03 PROCEDURE — 1101F PR PT FALLS ASSESS DOC 0-1 FALLS W/OUT INJ PAST YR: ICD-10-PCS | Mod: CPTII,S$GLB,, | Performed by: PHYSICIAN ASSISTANT

## 2020-01-03 PROCEDURE — 1126F PR PAIN SEVERITY QUANTIFIED, NO PAIN PRESENT: ICD-10-PCS | Mod: S$GLB,,, | Performed by: PHYSICIAN ASSISTANT

## 2020-01-03 PROCEDURE — 73130 XR HAND COMPLETE 3 VIEW LEFT: ICD-10-PCS | Mod: 26,LT,, | Performed by: RADIOLOGY

## 2020-01-03 PROCEDURE — 1126F AMNT PAIN NOTED NONE PRSNT: CPT | Mod: S$GLB,,, | Performed by: PHYSICIAN ASSISTANT

## 2020-01-03 PROCEDURE — 99999 PR PBB SHADOW E&M-EST. PATIENT-LVL III: CPT | Mod: PBBFAC,,, | Performed by: PHYSICIAN ASSISTANT

## 2020-01-03 PROCEDURE — 99213 OFFICE O/P EST LOW 20 MIN: CPT | Mod: S$GLB,,, | Performed by: PHYSICIAN ASSISTANT

## 2020-01-03 PROCEDURE — 73130 X-RAY EXAM OF HAND: CPT | Mod: 26,LT,, | Performed by: RADIOLOGY

## 2020-01-03 PROCEDURE — 99213 PR OFFICE/OUTPT VISIT, EST, LEVL III, 20-29 MIN: ICD-10-PCS | Mod: S$GLB,,, | Performed by: PHYSICIAN ASSISTANT

## 2020-01-03 PROCEDURE — 1101F PT FALLS ASSESS-DOCD LE1/YR: CPT | Mod: CPTII,S$GLB,, | Performed by: PHYSICIAN ASSISTANT

## 2020-01-03 PROCEDURE — 99999 PR PBB SHADOW E&M-EST. PATIENT-LVL III: ICD-10-PCS | Mod: PBBFAC,,, | Performed by: PHYSICIAN ASSISTANT

## 2020-01-03 PROCEDURE — 1159F PR MEDICATION LIST DOCUMENTED IN MEDICAL RECORD: ICD-10-PCS | Mod: S$GLB,,, | Performed by: PHYSICIAN ASSISTANT

## 2020-01-03 NOTE — PROGRESS NOTES
Subjective:      Patient ID: Marietta Rosales is a 70 y.o. female.    Chief Complaint: Pain of the Left Hand      HPI  Marietta Rosales is a right hand dominant 70 y.o. female presenting today for follow up of fractures to the 4th and 5th metacarpals as well as to the base of the 4th and 5th proximal phalanges.  There was a history of trauma- she fell on uneven sidewalk hitting her face and outstretched left hand.  Injury occurred on 11/8/19.  She has been in a TKO brace for the past 3-4 weeks, she has also started OT. She reports mild aching intermittently, no significant pain. She denies any finger numbness or tingling.  She reports a history of a trauma to the left small finger at the age of 19, reports that this laceration caused decreased motion of the left small finger PIP and DIP joints.      Review of patient's allergies indicates:   Allergen Reactions    Codeine Nausea Only     Pt states she get stomach cramps when she take codeine.         Current Outpatient Medications   Medication Sig Dispense Refill    nortriptyline (PAMELOR) 75 MG Cap TAKE 1 CAPSULE BY MOUTH EVERY DAY 90 capsule 2    DULoxetine (CYMBALTA) 20 MG capsule Take 1 capsule (20 mg total) by mouth once daily. (Patient not taking: Reported on 1/3/2020) 30 capsule 1    IBUPROFEN ORAL Take by mouth.      meclizine (ANTIVERT) 25 mg tablet Take 1 tablet (25 mg total) by mouth 3 (three) times daily as needed for Dizziness. (Patient not taking: Reported on 1/3/2020) 30 tablet 3     No current facility-administered medications for this visit.        Past Medical History:   Diagnosis Date    Anemia     Cancer     Dilated bile duct     GERD (gastroesophageal reflux disease)     Hypothyroid     Lung cancer        Past Surgical History:   Procedure Laterality Date    ENDOSCOPIC ULTRASOUND OF UPPER GASTROINTESTINAL TRACT N/A 4/11/2019    Procedure: ULTRASOUND, UPPER GI TRACT, ENDOSCOPIC;  Surgeon: Familia Montes De Oca MD;  Location: [facility] ([address] [place] [address]);   "Service: Endoscopy;  Laterality: N/A;    LUNG REMOVAL, PARTIAL      MEDIPORT REMOVAL N/A 3/19/2019    Procedure: REMOVAL, CATHETER, CENTRAL VENOUS, TUNNELED, WITH PORT;  Surgeon: Sonya Diagnostic Provider;  Location: [facility] OR [address] [place] [address];  Service: Radiology;  Laterality: N/A;  188/sandow    MI REMOVAL OF LUNG,LOBECTOMY Right     Lobectomy         Review of Systems:  Review of Systems   Constitution: Negative for chills and fever.   Skin: Negative for rash and suspicious lesions.   Musculoskeletal:        See HPI   Neurological: Negative for dizziness, headaches, light-headedness, numbness and paresthesias.   Psychiatric/Behavioral: Negative for depression. The patient is not nervous/anxious.          OBJECTIVE:     PHYSICAL EXAM:  Height: 5' 4" (162.6 cm) Weight: 74.4 kg (164 lb)  Vitals:    01/03/20 0848   BP: 118/78   Pulse: 80   Weight: 74.4 kg (164 lb)   Height: 5' 4" (1.626 m)   PainSc: 0-No pain     General    Vitals reviewed.  Constitutional: She is oriented to person, place, and time. She appears well-developed and well-nourished.   HENT:   Head: Normocephalic and atraumatic.   Neck: Normal range of motion.   Cardiovascular: Normal rate.    Pulmonary/Chest: Effort normal. No respiratory distress.   Neurological: She is alert and oriented to person, place, and time.   Psychiatric: She has a normal mood and affect. Her behavior is normal. Judgment and thought content normal.             Musculoskeletal:  Well-healed scars left small finger from prior trauma at age 19.  No residual ecchymosis, mild edema noted left ulnar hand and fingers.  She is nontender to palpation over the left small and ring fingers as well as the 4th and 5th metacarpals.  Decreased finger range of motion on the left, improving.  Good finger motion on the right.  improved left wrist range of motion on the left. Neurovascularly intact-patient reports good equal sensation, good motor function, good capillary refill, 2+ radial " pulses.    RADIOGRAPHS:  Left Hand XRay, 1/3/2020  FINDINGS:  There are healing fractures of the 4th and 5th proximal phalanxes and metacarpal head/neck.  No suspicious lytic or blastic lesions.  No subluxation or dislocation.  There is negative ulnar variance.  Soft tissues are unremarkable.  No radiopaque foreign bodies.      Impression     Healing fractures of the 4th and 5th metacarpals and adjacent proximal phalanges.       Comments: I have personally reviewed the imaging and I agree with the above radiologist's report.    ASSESSMENT/PLAN:   Marietta was seen today for pain.    Diagnoses and all orders for this visit:    Closed fracture of left hand, initial encounter  -     Ambulatory referral/consult to Orthopedics           - We talked at length about the anatomy and pathophysiology of   Encounter Diagnosis   Name Primary?    Closed fracture of left hand, initial encounter         - TKO splint - wean out; start srinivasan tape as instructed in OT  - regular OT  - follow-up in 4 weeks with x-ray  - no lifting or weight-bearing  - call with questions or concerns    Disclaimer: This note has been generated using voice-recognition software. There may be typographical errors that have been missed during proof-reading.

## 2020-01-03 NOTE — LETTER
January 3, 2020      Natalie Aponte MD  [address]  [facility] [address]           [facility] [place] [facility] [address]  [address], [address]  [facility] [address]  Phone: [phone]          Patient: Marietta Rosales   MR Number: [id number]   YOB: 1950   Date of Visit: 1/3/2020       Dear Dr. Natalie Aponte:    Thank you for referring Marietta Rosales to me for evaluation. Attached you will find relevant portions of my assessment and plan of care.    If you have questions, please do not hesitate to call me. I look forward to following Marietta Rosales along with you.    Sincerely,    NICOLE Stovall    Enclosure  CC:  No Recipients    If you would like to receive this communication electronically, please contact [email or web address] or [phone] to request more information on [employer] access.    For providers and/or their staff who would like to refer a patient to [facility], please contact us through our one-stop-shop provider referral line, [facility], at [phone].    If you feel you have received this communication in error or would no longer like to receive these types of communications, please e-mail [email or web address]

## 2020-01-08 NOTE — PROGRESS NOTES
"  Occupational Therapy Daily Treatment Note     Date: 1/2/2020  Name: Marietta Rosales  Clinic Number: [id number]      Medical Diagnosis: Left 4th/5th MCP and PP fractures, non op   Therapy Diagnosis:        Encounter Diagnoses   Name Primary?    Range of motion deficit      Hand pain, left      Decreased  strength of left hand        Physician: Elisabeth Triana PA     Physician Orders: eval and treat      Surgical Procedure and Date: N/A   Evaluation Date: 12/24/2019     Plan of Care Certification Period: 2/24/2020  Date of Return to MD: 1/3/2020     Visit # / Visits authorized: 1 / 6  Insurance Authorization Period Expiration: 1/6/2020  FOTO  (VISIT 1)      Time In:11 am   Time Out: 1145 am   Total Billable Time:45 minutes     Precautions:  Standard, Lung Cancer       Subjective     Pt reports: "My fingers are still stiff, am I suppose to get putty to do while I'm out of town???"  she was compliant with home exercise program given last session.   Response to previous treatment: more motion, less pain   Functional change: no change reported     Pain: 3/10  Location: left hands      Objective   Marietta received the following supervised modalities after being cleared for contradictions for 10 minutes:   -Patient received paraffin bath to left  hand(s) for 10 minutes to increase blood flow, circulation, pain management and for tissue elasticity prior to therex.      Marietta received therapeutic exercises for 20 minutes including:  - blocking FDP, FDS, isolated FDS glide, hook, wave, flat and composite fist, digit ab/ad and digit extension x 10 reps each   - added srinivasan taping for ring/small for hook, wave, full fisting as tolerated to transition from brace at home to improve AAROM.   - active wrist flex, ext, RD, UD x 10 reps each, 3-5 x daily   - reviewed modality use for pain, edema  - adjusted orthosis to increase MP flexion. Encouraged use with activity , remove for bathing, hygiene, HEP     Marietta received the " following manual therapy techniques for 15 minutes:   -RM to decrease edema and to improve lymphatic drainage to improve joint mobility   - gentle PROM of ring/small finger, isolated and composite to improve joint ROM/flexibility       Home Exercises and Education Provided     Education provided:   - srinivasan taping during day use, continue orthosis use with activity or in public   - Progress towards goals     Written Home Exercises Provided: Patient instructed to cont prior HEP.  Exercises were reviewed and Marietta was able to demonstrate them prior to the end of the session.  Marietta demonstrated good  understanding of the HEP provided.   .   See EMR under Patient Instructions for exercises provided prior visit.        Assessment     Marietta is progressing well towards her goals and there are no updates to goals at this time. Pt prognosis is Good.     MP stiffness remains in ring/small fingers.  Attempted to adjust orthosis to improve MP stretch but patient reporting orthosis uncomfortable.  Awaiting MD visit to transition from brace to srinivasan taping as tolerated to improve joint mobility.  Patient to be out of town x 2 weeks.  Will progress with strengthening upon return. Pt will continue to benefit from skilled outpatient occupational therapy to address the deficits listed in the problem list on initial evaluation to improve ROM, strength, pain management, /pinch strength, functional use, scar and edema management and to maximize pt's level of independence with ADLs in the home, work  and community environment.     Anticipated barriers to occupational therapy: None     Pt's spiritual, cultural and educational needs considered and pt agreeable to plan of care and goals.    The following goals were discussed with the patient and patient is in agreement with them as to be addressed in the treatment plan.       Goals:   Short Term Goals (4 weeks)   1)  Patient to be IND with HEP and modalities for pain management  2)   Increase ROM 5-9 degrees to increase functional hand use for ADLs/work/leisure activities---progressing   3)   Decrease edema .1-.3 mm to increase joint mobility /flexibility for functional hand use. --progressing   4)  Assess  set goals accordingly   5)  Patient to score at  40-65%  or less on FOTO to demonstrate improved perception of functional left hand  Use.        Long Term Goals (8 weeks)   1)  Increase  strength 2-8 lbs. For gripping steering wheel for driving and for home chores   2) Patient to score at  30-40%  or less on FOTO to demonstrate improved perception of functional left hand  Use.  3)  Increase ROM 10-20  degrees to increase functional hand use for ADLs/work/leisure activities        Plan   Recommend continued Outpatient Occupataional Therapy  1x week for 8 weeks to include the following interventions Paraffin, Manual therapy/joint mobilizations, Modalities for pain management, US 3 mhz, Therapeutic exercises/activities., Strengthening, Edema Control, Scar Management, Wound Care and Electrical Modalities.     Within the Certification Period/Plan of care expiration: 12/24/2019 to 2/24/2020    Rosemarie Mayo OT, CHT

## 2020-01-29 ENCOUNTER — TELEPHONE (OUTPATIENT)
Dept: ORTHOPEDICS | Facility: CLINIC | Age: 70
End: 2020-01-29

## 2020-01-30 ENCOUNTER — OFFICE VISIT (OUTPATIENT)
Dept: ORTHOPEDICS | Facility: CLINIC | Age: 70
End: 2020-01-30
Attending: INTERNAL MEDICINE
Payer: MEDICARE

## 2020-01-30 ENCOUNTER — HOSPITAL ENCOUNTER (OUTPATIENT)
Dept: RADIOLOGY | Facility: OTHER | Age: 70
Discharge: HOME | End: 2020-01-30
Attending: PHYSICIAN ASSISTANT
Payer: MEDICARE

## 2020-01-30 ENCOUNTER — CLINICAL SUPPORT (OUTPATIENT)
Dept: REHABILITATION | Facility: HOSPITAL | Age: 70
End: 2020-01-30
Attending: PHYSICIAN ASSISTANT
Payer: MEDICARE

## 2020-01-30 VITALS
DIASTOLIC BLOOD PRESSURE: 73 MMHG | HEIGHT: 64 IN | BODY MASS INDEX: 28 KG/M2 | SYSTOLIC BLOOD PRESSURE: 132 MMHG | HEART RATE: 75 BPM | WEIGHT: 164 LBS

## 2020-01-30 DIAGNOSIS — M25.60 RANGE OF MOTION DEFICIT: ICD-10-CM

## 2020-01-30 DIAGNOSIS — S62.92XA CLOSED FRACTURE OF LEFT HAND, INITIAL ENCOUNTER: ICD-10-CM

## 2020-01-30 DIAGNOSIS — S62.617A CLOSED DISPLACED FRACTURE OF PROXIMAL PHALANX OF LEFT LITTLE FINGER, INITIAL ENCOUNTER: ICD-10-CM

## 2020-01-30 DIAGNOSIS — M79.642 HAND PAIN, LEFT: ICD-10-CM

## 2020-01-30 DIAGNOSIS — S62.615A CLOSED DISPLACED FRACTURE OF PROXIMAL PHALANX OF LEFT RING FINGER, INITIAL ENCOUNTER: Primary | ICD-10-CM

## 2020-01-30 DIAGNOSIS — R29.898 DECREASED GRIP STRENGTH OF LEFT HAND: ICD-10-CM

## 2020-01-30 DIAGNOSIS — S62.337A CLOSED DISPLACED FRACTURE OF NECK OF FIFTH METACARPAL BONE OF LEFT HAND, INITIAL ENCOUNTER: ICD-10-CM

## 2020-01-30 DIAGNOSIS — S62.335A CLOSED DISPLACED FRACTURE OF NECK OF FOURTH METACARPAL BONE OF LEFT HAND, INITIAL ENCOUNTER: ICD-10-CM

## 2020-01-30 PROCEDURE — 97140 MANUAL THERAPY 1/> REGIONS: CPT

## 2020-01-30 PROCEDURE — 73130 X-RAY EXAM OF HAND: CPT | Mod: TC,FY,LT

## 2020-01-30 PROCEDURE — 1125F PR PAIN SEVERITY QUANTIFIED, PAIN PRESENT: ICD-10-PCS | Mod: S$GLB,,, | Performed by: PHYSICIAN ASSISTANT

## 2020-01-30 PROCEDURE — 1101F PR PT FALLS ASSESS DOC 0-1 FALLS W/OUT INJ PAST YR: ICD-10-PCS | Mod: CPTII,S$GLB,, | Performed by: PHYSICIAN ASSISTANT

## 2020-01-30 PROCEDURE — 97110 THERAPEUTIC EXERCISES: CPT

## 2020-01-30 PROCEDURE — 97018 PARAFFIN BATH THERAPY: CPT | Mod: 59

## 2020-01-30 PROCEDURE — 1101F PT FALLS ASSESS-DOCD LE1/YR: CPT | Mod: CPTII,S$GLB,, | Performed by: PHYSICIAN ASSISTANT

## 2020-01-30 PROCEDURE — 1159F PR MEDICATION LIST DOCUMENTED IN MEDICAL RECORD: ICD-10-PCS | Mod: S$GLB,,, | Performed by: PHYSICIAN ASSISTANT

## 2020-01-30 PROCEDURE — 1159F MED LIST DOCD IN RCRD: CPT | Mod: S$GLB,,, | Performed by: PHYSICIAN ASSISTANT

## 2020-01-30 PROCEDURE — 1125F AMNT PAIN NOTED PAIN PRSNT: CPT | Mod: S$GLB,,, | Performed by: PHYSICIAN ASSISTANT

## 2020-01-30 PROCEDURE — 73130 X-RAY EXAM OF HAND: CPT | Mod: 26,LT,, | Performed by: RADIOLOGY

## 2020-01-30 PROCEDURE — 99213 PR OFFICE/OUTPT VISIT, EST, LEVL III, 20-29 MIN: ICD-10-PCS | Mod: S$GLB,,, | Performed by: PHYSICIAN ASSISTANT

## 2020-01-30 PROCEDURE — 73130 XR HAND COMPLETE 3 VIEW LEFT: ICD-10-PCS | Mod: 26,LT,, | Performed by: RADIOLOGY

## 2020-01-30 PROCEDURE — 99999 PR PBB SHADOW E&M-EST. PATIENT-LVL III: ICD-10-PCS | Mod: PBBFAC,,, | Performed by: PHYSICIAN ASSISTANT

## 2020-01-30 PROCEDURE — 99213 OFFICE O/P EST LOW 20 MIN: CPT | Mod: S$GLB,,, | Performed by: PHYSICIAN ASSISTANT

## 2020-01-30 PROCEDURE — 99999 PR PBB SHADOW E&M-EST. PATIENT-LVL III: CPT | Mod: PBBFAC,,, | Performed by: PHYSICIAN ASSISTANT

## 2020-01-30 NOTE — LETTER
January 30, 2020      Natalie Aponte MD  [address]  [facility] [address]           [facility] [place] [address] [facility] [address]  [address], [address]  [facility] [address]  Phone: [phone]          Patient: Marietta Rosales   MR Number: [id number]   YOB: 1950   Date of Visit: 1/30/2020       Dear Dr. Natalie Aponte:    Thank you for referring Marietta Rosales to me for evaluation. Attached you will find relevant portions of my assessment and plan of care.    If you have questions, please do not hesitate to call me. I look forward to following Marietta Rosales along with you.    Sincerely,    NICOLE Stovall    Enclosure  CC:  No Recipients    If you would like to receive this communication electronically, please contact [email or web address] or [phone] to request more information on [employer] Link access.    For providers and/or their staff who would like to refer a patient to [facility], please contact us through our one-stop-shop provider referral line, [facility], at [phone].    If you feel you have received this communication in error or would no longer like to receive these types of communications, please e-mail [email or web address]

## 2020-01-30 NOTE — PROGRESS NOTES
"  Occupational Therapy Daily Treatment Note     Date: 1/30/2020  Name: Marietta Rosales  Clinic Number: [id number]      Medical Diagnosis: Left 4th/5th MCP and PP fractures, non op   Therapy Diagnosis:        Encounter Diagnoses   Name Primary?    Range of motion deficit      Hand pain, left      Decreased  strength of left hand        Physician: Elisabeth Triana PA     Physician Orders: eval and treat      Surgical Procedure and Date: N/A   Evaluation Date: 12/24/2019     Plan of Care Certification Period: 2/24/2020  Date of Return to MD: 1/3/2020     Visit # / Visits authorized: 3/ 6  Insurance Authorization Period Expiration: pending   FOTO  (VISIT 1)      Time In:11 am   Time Out: 1145 am   Total Billable Time:45 minutes     Precautions:  Standard, Lung Cancer , old DIP injury small finger       Subjective     Pt reports: "My fingers are still stiff, but they are bending better. "  she was compliant with home exercise program given last session.   Response to previous treatment: more motion, less pain   Functional change: no change reported     Pain: 3/10  Location: left hands      Objective   Marietat received the following supervised modalities after being cleared for contradictions for 10 minutes:   -Patient received paraffin bath to left  hand(s) for 10 minutes to increase blood flow, circulation, pain management and for tissue elasticity prior to therex.      Marietta received therapeutic exercises for 25 minutes including:  - blocking FDP, FDS, isolated FDS glide, hook, wave, flat and composite fist, digit ab/ad and digit extension x 10 reps each   - added srinivasan taping for ring/small for hook, wave, full fisting as tolerated to transition from brace at home to improve AAROM.   - active wrist flex, ext, RD, UD x 10 reps each, 3-5 x daily   - yellow putty for gross , moulding x 1-2 min, raking, reverse raking, composite digit extension "donut", digit adduction x 10 reps each.   - yellow digi flex for " isolated and composite digit flexion x 10 reps each.   - reviewed modality use for pain, edema    Marietta received the following manual therapy techniques for 10 minutes:   -RM to decrease edema and to improve lymphatic drainage to improve joint mobility   - gentle PROM of ring/small finger, isolated and composite to improve joint ROM/flexibility       Home Exercises and Education Provided     Education provided:   - srinivasan taping during day use, continue orthosis use with activity or in public   - Progress towards goals     Written Home Exercises Provided: Patient instructed to cont prior HEP.  Exercises were reviewed and Marietta was able to demonstrate them prior to the end of the session.  Marietta demonstrated good  understanding of the HEP provided.   .   See EMR under Patient Instructions for exercises provided prior visit.        Assessment     Marietta is progressing well towards her goals and there are no updates to goals at this time. Pt prognosis is Good.     MP stiffness remains in ring  finges. Patient returning today following 2 week vacation.  Tolerated putty exercises well.  Improved fist noted with small finger limitations due to old injury.   Will progress with strengthening as tolerated. Pt will continue to benefit from skilled outpatient occupational therapy to address the deficits listed in the problem list on initial evaluation to improve ROM, strength, pain management, /pinch strength, functional use, scar and edema management and to maximize pt's level of independence with ADLs in the home, work  and community environment.     Anticipated barriers to occupational therapy: None     Pt's spiritual, cultural and educational needs considered and pt agreeable to plan of care and goals.    The following goals were discussed with the patient and patient is in agreement with them as to be addressed in the treatment plan.       Goals:   Short Term Goals (4 weeks)   1)  Patient to be IND with HEP and  modalities for pain management  2)  Increase ROM 5-9 degrees to increase functional hand use for ADLs/work/leisure activities---progressing   3)   Decrease edema .1-.3 mm to increase joint mobility /flexibility for functional hand use. --progressing   4)  Assess  set goals accordingly   5)  Patient to score at  40-65%  or less on FOTO to demonstrate improved perception of functional left hand  Use.        Long Term Goals (8 weeks)   1)  Increase  strength 2-8 lbs. For gripping steering wheel for driving and for home chores   2) Patient to score at  30-40%  or less on FOTO to demonstrate improved perception of functional left hand  Use.  3)  Increase ROM 10-20  degrees to increase functional hand use for ADLs/work/leisure activities        Plan   Recommend continued Outpatient Occupataional Therapy  1x week for 8 weeks to include the following interventions Paraffin, Manual therapy/joint mobilizations, Modalities for pain management, US 3 mhz, Therapeutic exercises/activities., Strengthening, Edema Control, Scar Management, Wound Care and Electrical Modalities.     Within the Certification Period/Plan of care expiration: 12/24/2019 to 2/24/2020    Rosemarie Mayo, OT, CHT

## 2020-01-30 NOTE — PATIENT INSTRUCTIONS
"[facility]  OCCUPATIONAL THERAPY  HOME EXERCISE PROGRAM     Complete the following strengthening program 10 reps 1-2x/day.         For 1-2 min     hold 3-5 seconds     "donut"        _                        CHRISTOPHE Preciado CHT  Certified Hand Therapist  Occupational Therapist          "

## 2020-01-30 NOTE — PROGRESS NOTES
Subjective:      Patient ID: Marietta Rosales is a 70 y.o. female.    Chief Complaint: Pain of the Left Hand      HPI  Marietta Rosales is a right hand dominant 70 y.o. female presenting today for follow up of fractures to the 4th and 5th metacarpals as well as to the base of the 4th and 5th proximal phalanges.  There was a history of trauma- she fell on uneven sidewalk hitting her face and outstretched left hand.  Injury occurred on 11/8/19.  She has been weaning out of TKO brace and started OT. Her finger motion is improving.  She denies any significant pain. She denies any finger numbness or tingling.  She reports a history of a trauma to the left small finger at the age of 19, reports that this laceration caused decreased motion of the left small finger PIP and DIP joints.      Review of patient's allergies indicates:   Allergen Reactions    Codeine Nausea Only     Pt states she get stomach cramps when she take codeine.         Current Outpatient Medications   Medication Sig Dispense Refill    nortriptyline (PAMELOR) 75 MG Cap TAKE 1 CAPSULE BY MOUTH EVERY DAY 90 capsule 2    DULoxetine (CYMBALTA) 20 MG capsule Take 1 capsule (20 mg total) by mouth once daily. (Patient not taking: Reported on 1/3/2020) 30 capsule 1    IBUPROFEN ORAL Take by mouth.      meclizine (ANTIVERT) 25 mg tablet Take 1 tablet (25 mg total) by mouth 3 (three) times daily as needed for Dizziness. (Patient not taking: Reported on 1/3/2020) 30 tablet 3     No current facility-administered medications for this visit.        Past Medical History:   Diagnosis Date    Anemia     Cancer     Dilated bile duct     GERD (gastroesophageal reflux disease)     Hypothyroid     Lung cancer        Past Surgical History:   Procedure Laterality Date    ENDOSCOPIC ULTRASOUND OF UPPER GASTROINTESTINAL TRACT N/A 4/11/2019    Procedure: ULTRASOUND, UPPER GI TRACT, ENDOSCOPIC;  Surgeon: Familia Montes De Oca MD;  [facility] ([address] [place] [address]);  Service:  "Endoscopy;  Laterality: N/A;    LUNG REMOVAL, PARTIAL      MEDIPORT REMOVAL N/A 3/19/2019    Procedure: REMOVAL, CATHETER, CENTRAL VENOUS, TUNNELED, WITH PORT;  Surgeon: Sonya Diagnostic Provider;  Location: [facility] OR [address] [place] [address];  Service: Radiology;  Laterality: N/A;  188/sandow    [place] REMOVAL OF LUNG,LOBECTOMY Right     Lobectomy         Review of Systems:  Review of Systems   Constitution: Negative for chills and fever.   Skin: Negative for rash and suspicious lesions.   Musculoskeletal:        See HPI   Neurological: Negative for dizziness, headaches, light-headedness, numbness and paresthesias.   Psychiatric/Behavioral: Negative for depression. The patient is not nervous/anxious.          OBJECTIVE:     PHYSICAL EXAM:  Height: 5' 4" (162.6 cm) Weight: 74.4 kg (164 lb)  Vitals:    01/30/20 1012   BP: 132/73   Pulse: 75   Weight: 74.4 kg (164 lb)   Height: 5' 4" (1.626 m)   PainSc:   1     General    Vitals reviewed.  Constitutional: She is oriented to person, place, and time. She appears well-developed and well-nourished.   HENT:   Head: Normocephalic and atraumatic.   Neck: Normal range of motion.   Cardiovascular: Normal rate.    Pulmonary/Chest: Effort normal. No respiratory distress.   Neurological: She is alert and oriented to person, place, and time.   Psychiatric: She has a normal mood and affect. Her behavior is normal. Judgment and thought content normal.             Musculoskeletal:  Well-healed scars left small finger from prior trauma at age 19.  No ecchymosis, no significant edema noted left ulnar hand and fingers.  She is nontender to palpation over the left small and ring fingers as well as the 4th and 5th metacarpals.  Improving finger range of motion on the left, residual small finger deficit from prior injury.  Good finger motion on the right.  Improved left wrist range of motion. Neurovascularly intact-patient reports good equal sensation, good motor function, good capillary refill, 2+ radial " pulses.    RADIOGRAPHS:  Left Hand XRay, 1/30/2020  FINDINGS:  Once again, there are healing fractures of the distal aspects of the 4th and 5th metacarpal bones and the bases of the proximal phalanges of the 4th and 5th fingers.  There has been progression of the healing with no detrimental change in the position and alignment of the fracture fragments when compared to the prior examination.  The bones do appear osteoporotic.      Impression     Healing fractures involving the distal aspects of the 4th and 5th metacarpal bones and the bases of the proximal phalanges of the 4th and 5th fingers.  No detrimental interval change noted.     Comments: I have personally reviewed the imaging and I agree with the above radiologist's report.    ASSESSMENT/PLAN:   Marietta was seen today for pain.    Diagnoses and all orders for this visit:    Closed displaced fracture of proximal phalanx of left ring finger, initial encounter    Closed displaced fracture of proximal phalanx of left little finger, initial encounter    Closed displaced fracture of neck of fourth metacarpal bone of left hand, initial encounter    Closed displaced fracture of neck of fifth metacarpal bone of left hand, initial encounter           - We talked at length about the anatomy and pathophysiology of   Encounter Diagnoses   Name Primary?    Closed displaced fracture of proximal phalanx of left ring finger, initial encounter Yes    Closed displaced fracture of proximal phalanx of left little finger, initial encounter     Closed displaced fracture of neck of fourth metacarpal bone of left hand, initial encounter     Closed displaced fracture of neck of fifth metacarpal bone of left hand, initial encounter           - regular OT and HEP  - follow-up as needed  - no lifting or weight-bearing  - call with questions or concerns    Disclaimer: This note has been generated using voice-recognition software. There may be typographical errors that have been missed  during proof-reading.

## 2020-02-04 PROBLEM — K83.8 DILATED BILE DUCT: Status: RESOLVED | Noted: 2019-04-11 | Resolved: 2020-02-04

## 2020-02-04 PROBLEM — E03.8 SUBCLINICAL HYPOTHYROIDISM: Status: ACTIVE | Noted: 2019-02-26

## 2020-02-04 PROBLEM — R42 VERTIGO: Status: ACTIVE | Noted: 2020-02-04

## 2020-02-04 PROBLEM — Z85.118 HISTORY OF LUNG CANCER: Status: ACTIVE | Noted: 2020-02-04

## 2020-02-04 PROBLEM — D53.9 MACROCYTIC ANEMIA: Status: ACTIVE | Noted: 2020-02-04

## 2020-02-04 PROBLEM — Z45.2 ENCOUNTER FOR CARE RELATED TO VASCULAR ACCESS PORT: Status: RESOLVED | Noted: 2019-03-19 | Resolved: 2020-02-04

## 2020-02-05 ENCOUNTER — TELEPHONE (OUTPATIENT)
Dept: INTERNAL MEDICINE | Facility: CLINIC | Age: 70
End: 2020-02-05

## 2020-02-05 NOTE — TELEPHONE ENCOUNTER
----- Message from Zulma Jimenez sent at 2/5/2020  4:08 PM CST -----  Contact: self  Patient is requesting a call back concerning rescheduling her appt that she missed on 2/4, pt is looking to est care with the Dr. Please call

## 2020-02-13 ENCOUNTER — CLINICAL SUPPORT (OUTPATIENT)
Dept: REHABILITATION | Facility: HOSPITAL | Age: 70
End: 2020-02-13
Payer: MEDICARE

## 2020-02-13 DIAGNOSIS — M79.642 HAND PAIN, LEFT: ICD-10-CM

## 2020-02-13 DIAGNOSIS — R29.898 DECREASED GRIP STRENGTH OF LEFT HAND: ICD-10-CM

## 2020-02-13 DIAGNOSIS — M25.60 RANGE OF MOTION DEFICIT: ICD-10-CM

## 2020-02-13 PROCEDURE — 97110 THERAPEUTIC EXERCISES: CPT

## 2020-02-13 PROCEDURE — 97018 PARAFFIN BATH THERAPY: CPT | Mod: 59

## 2020-02-13 PROCEDURE — 97140 MANUAL THERAPY 1/> REGIONS: CPT

## 2020-02-13 NOTE — PROGRESS NOTES
"  Occupational Therapy Daily Treatment Note     Date: 2/13/2020  Name: Marietta Rosales  Clinic Number: [id number]      Medical Diagnosis: Left 4th/5th MCP and PP fractures, non op   Therapy Diagnosis:        Encounter Diagnoses   Name Primary?    Range of motion deficit      Hand pain, left      Decreased  strength of left hand        Physician: Elisabeth Triana PA     Physician Orders: eval and treat      Surgical Procedure and Date: N/A   Evaluation Date: 12/24/2019     Plan of Care Certification Period: 2/24/2020  Date of Return to MD: 1/3/2020     Visit # / Visits authorized: 4/ 6  Insurance Authorization Period Expiration: pending   FOTO  (VISIT 1)      Time In:11 am   Time Out: 1145 am   Total Billable Time:45 minutes     Precautions:  Standard, Lung Cancer , old DIP injury small finger       Subjective     Pt reports: "My fingers are still stiff, but they are bending better. "  she was compliant with home exercise program given last session.   Response to previous treatment: more motion, less pain   Functional change: no change reported     Pain: 3/10  Location: left hands      Objective   Marietta received the following supervised modalities after being cleared for contradictions for 10 minutes:   -Patient received paraffin bath to left  hand(s) for 10 minutes to increase blood flow, circulation, pain management and for tissue elasticity prior to therex.      Marietta received therapeutic exercises for 25 minutes including:  - blocking FDP, FDS, isolated FDS glide, hook, wave, flat and composite fist, digit ab/ad and digit extension x 10 reps each   - added srinivasan taping for ring/small for hook, wave, full fisting as tolerated to transition from brace at home to improve AAROM.   - active wrist flex, ext, RD, UD x 10 reps each, 3-5 x daily   - yellow putty for gross , moulding x 1-2 min, raking, reverse raking, composite digit extension "donut", digit adduction x 10 reps each.   - yellow and red digi " flex for isolated and composite digit flexion x 10 reps each.   - reviewed modality use for pain, edema    Marietta received the following manual therapy techniques for 10 minutes:   -RM to decrease edema and to improve lymphatic drainage to improve joint mobility   - gentle PROM of ring/small finger, isolated and composite to improve joint ROM/flexibility       Home Exercises and Education Provided     Education provided:   - srinivasan taping during day use, continue orthosis use with activity or in public   - Progress towards goals     Written Home Exercises Provided: Patient instructed to cont prior HEP.  Exercises were reviewed and Marietta was able to demonstrate them prior to the end of the session.  Marietta demonstrated good  understanding of the HEP provided.   .   See EMR under Patient Instructions for exercises provided prior visit.        Assessment     Marietta is progressing well towards her goals and there are no updates to goals at this time. Pt prognosis is Good.     MP stiffness remains in ring and small finger. Tolerated putty exercises well.  Improved fist noted with small finger limitations due to old injury.   Will progress with strengthening as tolerated. Pt will continue to benefit from skilled outpatient occupational therapy to address the deficits listed in the problem list on initial evaluation to improve ROM, strength, pain management, /pinch strength, functional use, scar and edema management and to maximize pt's level of independence with ADLs in the home, work  and community environment.     Anticipated barriers to occupational therapy: None     Pt's spiritual, cultural and educational needs considered and pt agreeable to plan of care and goals.    The following goals were discussed with the patient and patient is in agreement with them as to be addressed in the treatment plan.       Goals:   Short Term Goals (4 weeks)   1)  Patient to be IND with HEP and modalities for pain management  2)   Increase ROM 5-9 degrees to increase functional hand use for ADLs/work/leisure activities---progressing   3)   Decrease edema .1-.3 mm to increase joint mobility /flexibility for functional hand use. --progressing   4)  Assess  set goals accordingly   5)  Patient to score at  40-65%  or less on FOTO to demonstrate improved perception of functional left hand  Use.        Long Term Goals (8 weeks)   1)  Increase  strength 2-8 lbs. For gripping steering wheel for driving and for home chores   2) Patient to score at  30-40%  or less on FOTO to demonstrate improved perception of functional left hand  Use.  3)  Increase ROM 10-20  degrees to increase functional hand use for ADLs/work/leisure activities        Plan   Recommend continued Outpatient Occupataional Therapy  1x week for 8 weeks to include the following interventions Paraffin, Manual therapy/joint mobilizations, Modalities for pain management, US 3 mhz, Therapeutic exercises/activities., Strengthening, Edema Control, Scar Management, Wound Care and Electrical Modalities.     Within the Certification Period/Plan of care expiration: 12/24/2019 to 2/24/2020    Rosemarie Mayo OT, CHT

## 2020-02-19 ENCOUNTER — CLINICAL SUPPORT (OUTPATIENT)
Dept: REHABILITATION | Facility: HOSPITAL | Age: 70
End: 2020-02-19
Payer: MEDICARE

## 2020-02-19 DIAGNOSIS — R29.898 DECREASED GRIP STRENGTH OF LEFT HAND: ICD-10-CM

## 2020-02-19 DIAGNOSIS — M25.60 RANGE OF MOTION DEFICIT: ICD-10-CM

## 2020-02-19 DIAGNOSIS — M79.642 HAND PAIN, LEFT: ICD-10-CM

## 2020-02-19 PROCEDURE — 97140 MANUAL THERAPY 1/> REGIONS: CPT

## 2020-02-19 PROCEDURE — 97018 PARAFFIN BATH THERAPY: CPT | Mod: 59

## 2020-02-19 PROCEDURE — 97110 THERAPEUTIC EXERCISES: CPT

## 2020-02-19 NOTE — PROGRESS NOTES
"  Occupational Therapy Daily Treatment Note     Date: 2/19/2020  Name: Marietta Rosales  Clinic Number: [id number]      Medical Diagnosis: Left 4th/5th MCP and PP fractures, non op   Therapy Diagnosis:        Encounter Diagnoses   Name Primary?    Range of motion deficit      Hand pain, left      Decreased  strength of left hand        Physician: Elisabeth Triana PA     Physician Orders: eval and treat      Surgical Procedure and Date: N/A   Evaluation Date: 12/24/2019     Plan of Care Certification Period: 2/24/2020  Date of Return to MD: 1/3/2020     Visit # / Visits authorized:5/ 8  Insurance Authorization Period Expiration: 4/4/20  FOTO  (VISIT 1)      Time In:330 pm   Time Out: 415 pm   Total Billable Time:45 minutes     Precautions:  Standard, Lung Cancer , old DIP injury small finger     Re-assess next visit and update POC  Subjective    Patient 15 min late this date    Pt reports: "I forget to warm them up at home, it does help with the stiffness  "  she was compliant with home exercise program given last session.   Response to previous treatment: more motion, less pain   Functional change: no change reported     Pain: 3/10  Location: left hands      Objective   Marietta received the following supervised modalities after being cleared for contradictions for 10 minutes:   -Patient received paraffin bath to left  hand(s) for 10 minutes to increase blood flow, circulation, pain management and for tissue elasticity prior to therex.      Marietta received therapeutic exercises for 25 minutes including:  - blocking FDP, FDS, isolated FDS glide, hook, wave, flat and composite fist, digit ab/ad and digit extension x 10 reps each   - added srinivasan taping for ring/small for hook, wave, full fisting as tolerated to transition from brace at home to improve AAROM.   - active wrist flex, ext, RD, UD x 10 reps each, 3-5 x daily   - yellow putty for gross , moulding x 1-2 min, raking, reverse raking, composite digit " "extension "donut", digit adduction x 10 reps each.   - yellow and red digi flex for isolated and composite digit flexion x 10 reps each.   - reviewed modality use for pain, edema    Marietta received the following manual therapy techniques for 10 minutes:   -RM to decrease edema and to improve lymphatic drainage to improve joint mobility   - gentle PROM of ring/small finger, isolated and composite to improve joint ROM/flexibility       Home Exercises and Education Provided     Education provided:   - srinivasan taping during day use, continue orthosis use with activity or in public   - Progress towards goals     Written Home Exercises Provided: Patient instructed to cont prior HEP.  Exercises were reviewed and Marietta was able to demonstrate them prior to the end of the session.  Marietta demonstrated good  understanding of the HEP provided.   .   See EMR under Patient Instructions for exercises provided prior visit.        Assessment     Marietta is progressing well towards her goals and there are no updates to goals at this time. Pt prognosis is Good.     MP stiffness remains in ring and small finger. Tolerated putty exercises well.  Improved fist noted with small finger limitations due to old injury.   Will progress with strengthening as tolerated. Pt will continue to benefit from skilled outpatient occupational therapy to address the deficits listed in the problem list on initial evaluation to improve ROM, strength, pain management, /pinch strength, functional use, scar and edema management and to maximize pt's level of independence with ADLs in the home, work  and community environment.     Anticipated barriers to occupational therapy: None     Pt's spiritual, cultural and educational needs considered and pt agreeable to plan of care and goals.    The following goals were discussed with the patient and patient is in agreement with them as to be addressed in the treatment plan.       Goals:   Short Term Goals (4 weeks) "   1)  Patient to be IND with HEP and modalities for pain management  2)  Increase ROM 5-9 degrees to increase functional hand use for ADLs/work/leisure activities---progressing   3)   Decrease edema .1-.3 mm to increase joint mobility /flexibility for functional hand use. --progressing   4)  Assess  set goals accordingly   5)  Patient to score at  40-65%  or less on FOTO to demonstrate improved perception of functional left hand  Use.        Long Term Goals (8 weeks)   1)  Increase  strength 2-8 lbs. For gripping steering wheel for driving and for home chores   2) Patient to score at  30-40%  or less on FOTO to demonstrate improved perception of functional left hand  Use.  3)  Increase ROM 10-20  degrees to increase functional hand use for ADLs/work/leisure activities        Plan   Recommend continued Outpatient Occupataional Therapy  1x week for 8 weeks to include the following interventions Paraffin, Manual therapy/joint mobilizations, Modalities for pain management, US 3 mhz, Therapeutic exercises/activities., Strengthening, Edema Control, Scar Management, Wound Care and Electrical Modalities.     Within the Certification Period/Plan of care expiration: 12/24/2019 to 2/24/2020    Rosemarie Mayo OT, CHT

## 2020-03-02 ENCOUNTER — CLINICAL SUPPORT (OUTPATIENT)
Dept: REHABILITATION | Facility: HOSPITAL | Age: 70
End: 2020-03-02
Payer: MEDICARE

## 2020-03-02 DIAGNOSIS — R29.898 DECREASED GRIP STRENGTH OF LEFT HAND: ICD-10-CM

## 2020-03-02 DIAGNOSIS — M25.60 RANGE OF MOTION DEFICIT: ICD-10-CM

## 2020-03-02 DIAGNOSIS — M79.642 HAND PAIN, LEFT: ICD-10-CM

## 2020-03-02 PROCEDURE — 97110 THERAPEUTIC EXERCISES: CPT

## 2020-03-02 PROCEDURE — 97140 MANUAL THERAPY 1/> REGIONS: CPT

## 2020-03-02 PROCEDURE — 97018 PARAFFIN BATH THERAPY: CPT | Mod: 59

## 2020-03-02 NOTE — PROGRESS NOTES
"i  Occupational Therapy Daily Treatment Note     Date: 3/2/2020  Name: Marietta Rosales  Clinic Number: [id number]      Medical Diagnosis: Left 4th/5th MCP and PP fractures, non op   Therapy Diagnosis:        Encounter Diagnoses   Name Primary?    Range of motion deficit      Hand pain, left      Decreased  strength of left hand        Physician: Elisabeth Triana PA     Physician Orders: eval and treat      Surgical Procedure and Date: N/A   Evaluation Date: 12/24/2019     Plan of Care Certification Period: 2/24/2020  Date of Return to MD:      Visit # / Visits authorized:  6/ 8  Insurance Authorization Period Expiration: 4/4/20  FOTO  (VISIT 1)      Time In:225 pm   Time Out: 410 pm   Total Billable Time:45 minutes     Precautions:  Standard, Lung Cancer , old DIP injury small finger     Re-assess next visit and update POC  Subjective    Patient 10 min late this date    Pt reports: "I've been trying to do them (the exercises) "  she was compliant with home exercise program given last session.   Response to previous treatment: more motion, less pain   Functional change: no change reported     Pain: 3/10  Location: left hands      Objective   Marietta received the following supervised modalities after being cleared for contradictions for 10 minutes:   -Patient received paraffin bath to left  hand(s) for 10 minutes to increase blood flow, circulation, pain management and for tissue elasticity prior to therex.      Marietta received therapeutic exercises for 25 minutes including:  - blocking FDP, FDS, isolated FDS glide, hook, wave, flat and composite fist, digit ab/ad and digit extension x 10 reps each   - added srinivasan taping for ring/small for hook, wave, full fisting as tolerated to transition from brace at home to improve AAROM.   - active wrist flex, ext, RD, UD x 10 reps each, 3-5 x daily   - yellow putty for gross , moulding x 1-2 min, raking, reverse raking, composite digit extension "donut", digit " adduction x 10 reps each.   - yellow and red digi flex for isolated and composite digit flexion x 10 reps each.   - double yellow digit extender for EDC, EDM strengthening x 10 reps each.   - reviewed modality use for pain, edema    Marietta received the following manual therapy techniques for 10 minutes:   -RM to decrease edema and to improve lymphatic drainage to improve joint mobility   - gentle PROM of ring/small finger, isolated and composite to improve joint ROM/flexibility       Home Exercises and Education Provided     Education provided:   - srinivasan taping during day use, continue orthosis use with activity or in public   - Progress towards goals     Written Home Exercises Provided: Patient instructed to cont prior HEP.  Exercises were reviewed and Marietta was able to demonstrate them prior to the end of the session.  Marietta demonstrated good  understanding of the HEP provided.   .   See EMR under Patient Instructions for exercises provided prior visit.        Assessment     Marietta is progressing well towards her goals and there are no updates to goals at this time. Pt prognosis is Good.     MP stiffness remains in ring and small finger. Tolerated putty exercises well.  Improved fist noted with small finger limitations due to old injury.   Will progress with strengthening as tolerated. Pt will continue to benefit from skilled outpatient occupational therapy to address the deficits listed in the problem list on initial evaluation to improve ROM, strength, pain management, /pinch strength, functional use, scar and edema management and to maximize pt's level of independence with ADLs in the home, work  and community environment.     Anticipated barriers to occupational therapy: None     Pt's spiritual, cultural and educational needs considered and pt agreeable to plan of care and goals.    The following goals were discussed with the patient and patient is in agreement with them as to be addressed in the  treatment plan.       Goals:   Short Term Goals (4 weeks)   1)  Patient to be IND with HEP and modalities for pain management  2)  Increase ROM 5-9 degrees to increase functional hand use for ADLs/work/leisure activities---progressing   3)   Decrease edema .1-.3 mm to increase joint mobility /flexibility for functional hand use. --progressing   4)  Assess  set goals accordingly   5)  Patient to score at  40-65%  or less on FOTO to demonstrate improved perception of functional left hand  Use.        Long Term Goals (8 weeks)   1)  Increase  strength 2-8 lbs. For gripping steering wheel for driving and for home chores   2) Patient to score at  30-40%  or less on FOTO to demonstrate improved perception of functional left hand  Use.  3)  Increase ROM 10-20  degrees to increase functional hand use for ADLs/work/leisure activities        Plan   Recommend continued Outpatient Occupataional Therapy  1x week for 8 weeks to include the following interventions Paraffin, Manual therapy/joint mobilizations, Modalities for pain management, US 3 mhz, Therapeutic exercises/activities., Strengthening, Edema Control, Scar Management, Wound Care and Electrical Modalities.     Within the Certification Period/Plan of care expiration: 3/2/2020 to 5/2/2020    I certify the need for these services furnished under the plan of treatment and while under my care.     ____________________________________________________________________  Physician/Referring Practitioner   Date of Signature       Rosemarie Mayo OT, CHT

## 2020-03-13 ENCOUNTER — CLINICAL SUPPORT (OUTPATIENT)
Dept: REHABILITATION | Facility: HOSPITAL | Age: 70
End: 2020-03-13
Payer: MEDICARE

## 2020-03-13 DIAGNOSIS — R29.898 DECREASED GRIP STRENGTH OF LEFT HAND: ICD-10-CM

## 2020-03-13 DIAGNOSIS — M79.642 HAND PAIN, LEFT: ICD-10-CM

## 2020-03-13 DIAGNOSIS — M25.60 RANGE OF MOTION DEFICIT: ICD-10-CM

## 2020-03-13 PROCEDURE — 97140 MANUAL THERAPY 1/> REGIONS: CPT

## 2020-03-13 PROCEDURE — 97018 PARAFFIN BATH THERAPY: CPT | Mod: 59

## 2020-03-13 PROCEDURE — 97110 THERAPEUTIC EXERCISES: CPT

## 2020-03-13 NOTE — PROGRESS NOTES
"i  Occupational Therapy Daily Treatment Note     Date: 3/13/2020  Name: Marietta Rosales  Clinic Number: [id number]      Medical Diagnosis: Left 4th/5th MCP and PP fractures, non op   Therapy Diagnosis:        Encounter Diagnoses   Name Primary?    Range of motion deficit      Hand pain, left      Decreased  strength of left hand        Physician: Elisabeth Triana PA     Physician Orders: eval and treat      Surgical Procedure and Date: N/A   Evaluation Date: 12/24/2019     Plan of Care Certification Period: 2/24/2020  Date of Return to MD:      Visit # / Visits authorized:  7/ 8  Insurance Authorization Period Expiration: 4/4/20  FOTO  (VISIT 1)      Time In: 10:20 am   Time Out: 11:10 am   Total Billable Time: 40 minutes     Precautions:  Standard, Lung Cancer , old DIP injury small finger     Re-assess next visit and update POC or DC    Subjective    Patient 10 min late this date    Pt reports: "I think it's getting better, but I still have a lot of swelling and stiffness".  she was compliant with home exercise program given last session.   Response to previous treatment: more motion, less pain   Functional change: able to grasp light objects with less difficulty/pain     Pain: 3/10  Location: left hands      Objective     Marietta received the following supervised modalities after being cleared for contradictions for 10 minutes:   -Patient received paraffin bath to left  hand(s) for 10 minutes to increase blood flow, circulation, pain management and for tissue elasticity prior to therex.      Marietta received therapeutic exercises for 20 minutes direct care and 10 minutes of supervised care including:  - blocking FDP, FDS, isolated FDS glide, hook, wave, flat and composite fist, digit ab/ad and digit extension x 10 reps each   -    - red putty for gross , moulding x 1-2 min, raking, reverse raking, composite digit extension "donut", digit adduction x 10 reps each.   - red digi flex for isolated and " composite digit flexion x 2/10 reps each.   - double yellow digit extender for EDC, EDM strengthening x 2/10 reps each.   - reviewed modality use for pain, edema    Marietta received the following manual therapy techniques for 10 minutes:   -RM to decrease edema and to improve lymphatic drainage to improve joint mobility   - gentle PROM of ring/small finger, isolated and composite to improve joint ROM/flexibility       Home Exercises and Education Provided     Education provided:   - Reviewed HEP, including srinivasan taping during day use, continue orthosis use with activity or in public   - Progress towards goals     Written Home Exercises Provided: Patient instructed to cont prior HEP.  Exercises were reviewed and Marietta was able to demonstrate them prior to the end of the session.  Marietta demonstrated good  understanding of the HEP provided.     See EMR under Patient Instructions for exercises provided prior visit.        Assessment     Marietta is progressing well towards her goals and there are no updates to goals at this time. Pt prognosis is Good.     Patient continues with minimal stiffness in her ring and small finger, which improves after heat modalities and exercises.  Edema is minimal in her involved fingers.  She continues to tolerate progression of treatment well. Will progress with strengthening as tolerated. Pt will continue to benefit from skilled outpatient occupational therapy to address the deficits listed in the problem list on initial evaluation to improve ROM, strength, pain management, /pinch strength, functional use, scar and edema management and to maximize pt's level of independence with ADLs in the home, work  and community environment.     Anticipated barriers to occupational therapy: None     Pt's spiritual, cultural and educational needs considered and pt agreeable to plan of care and goals.    The following goals were discussed with the patient and patient is in agreement with them as to  be addressed in the treatment plan.       Goals:   Short Term Goals (4 weeks)   1)  Patient to be IND with HEP and modalities for pain management  2)  Increase ROM 5-9 degrees to increase functional hand use for ADLs/work/leisure activities---progressing   3)   Decrease edema .1-.3 mm to increase joint mobility /flexibility for functional hand use. --progressing   4)  Assess  set goals accordingly   5)  Patient to score at  40-65%  or less on FOTO to demonstrate improved perception of functional left hand  Use.        Long Term Goals (8 weeks)   1)  Increase  strength 2-8 lbs. For gripping steering wheel for driving and for home chores   2) Patient to score at  30-40%  or less on FOTO to demonstrate improved perception of functional left hand  Use.  3)  Increase ROM 10-20  degrees to increase functional hand use for ADLs/work/leisure activities        Plan     Recommend continued Outpatient Occupataional Therapy  1x week for 8 weeks to include the following interventions Paraffin, Manual therapy/joint mobilizations, Modalities for pain management, US 3 mhz, Therapeutic exercises/activities., Strengthening, Edema Control, Scar Management, Wound Care and Electrical Modalities.     Within the Certification Period/Plan of care expiration: 3/2/2020 to 5/2/2020    Moshe Samuels, OT

## 2020-03-20 ENCOUNTER — PATIENT MESSAGE (OUTPATIENT)
Dept: HEMATOLOGY/ONCOLOGY | Facility: CLINIC | Age: 70
End: 2020-03-20

## 2020-04-02 ENCOUNTER — TELEPHONE (OUTPATIENT)
Dept: HEMATOLOGY/ONCOLOGY | Facility: CLINIC | Age: 70
End: 2020-04-02

## 2020-04-09 ENCOUNTER — TELEPHONE (OUTPATIENT)
Dept: HEMATOLOGY/ONCOLOGY | Facility: CLINIC | Age: 70
End: 2020-04-09

## 2020-04-28 ENCOUNTER — TELEPHONE (OUTPATIENT)
Dept: INTERNAL MEDICINE | Facility: CLINIC | Age: 70
End: 2020-04-28

## 2020-04-28 NOTE — TELEPHONE ENCOUNTER
----- Message from Inga Kelly sent at 4/28/2020 12:47 PM CDT -----  Contact: Patient [phone]  Requesting an RX refill or new RX.  Is this a refill or new RX:  Refill  RX name and strength: nortriptyline (PAMELOR) 75 MG CapDirections (copy/paste from chart):    Is this a 30 day or 90 day RX:    Local pharmacy or mail order pharmacy:  Local  Pharmacy name and phone # [id number] - [place], [place] - [address]  Comments:  Patient need RX by tonight please

## 2020-04-29 DIAGNOSIS — F32.A DEPRESSION, UNSPECIFIED DEPRESSION TYPE: ICD-10-CM

## 2020-04-29 RX ORDER — NORTRIPTYLINE HYDROCHLORIDE 75 MG/1
CAPSULE ORAL
Qty: 90 CAPSULE | Refills: 2 | Status: SHIPPED | OUTPATIENT
Start: 2020-04-29

## 2020-04-29 NOTE — TELEPHONE ENCOUNTER
----- Message from Filipe Mendoza sent at 4/29/2020  1:35 PM CDT -----  Contact: Pt [phone]  Is this a refill or new RX:  Refill    RX name and strength: nortriptyline (PAMELOR) 75 MG Cap    Pharmacy name and phone # [id number] - [place], [place] - [address] [phone] (Phone) [phone] (Fax)

## 2021-04-28 ENCOUNTER — PATIENT MESSAGE (OUTPATIENT)
Dept: RESEARCH | Facility: HOSPITAL | Age: 71
End: 2021-04-28

## 2022-03-01 NOTE — PROGRESS NOTES
Subjective:       Patient ID: Marietta Rosales is a 68 y.o. female.    Chief Complaint: Cancer (lung) and Shortness of Breath    HPI 67 yo female referred from the [facility] for assessment of bronchogenic carcinoma. She is originally from [place] where she had a right upper lobectomy followed by six months of chemotherapy about  18 months ago. She moved to [place] at the beginning of the year to be near family members and escape the illegal aliens.  She feels well, needs to establish long term care. She still has a  port in her left  anterior chest and has no obvious reoccurence of her disease on the chest x-day done today. Only change in the right chest compatible with a right upper lobectomy. Recent labs in July show only a mild anemia and a slightly reduced GFR.  Review of Systems   Constitutional: Negative.    HENT: Negative.    Eyes: Negative.    Respiratory: Negative.         S/P Right upper lobectomy for lung cancer followed by chemotherapy for six months.   Cardiovascular: Negative.    Genitourinary: Negative.    Musculoskeletal: Negative.    Skin: Negative.    Gastrointestinal: Negative.    Neurological: Negative.    Psychiatric/Behavioral: Negative.        Objective:      Physical Exam   Constitutional: She is oriented to person, place, and time. She appears well-developed and well-nourished. No distress.   HENT:   Head: Normocephalic and atraumatic.   Right Ear: External ear normal.   Left Ear: External ear normal.   Nose: Nose normal.   Mouth/Throat: Oropharynx is clear and moist.   Eyes: Conjunctivae and EOM are normal. Pupils are equal, round, and reactive to light.   Neck: Normal range of motion. Neck supple. No JVD present. No thyromegaly present.   Cardiovascular: Normal rate, regular rhythm, normal heart sounds and intact distal pulses. Exam reveals no gallop.   No murmur heard.  Pulmonary/Chest: Breath sounds normal. No stridor. No respiratory distress. She has no wheezes. She has no  rales. She exhibits no tenderness.   Decreased breath sound over right base   Abdominal: Soft. Bowel sounds are normal. She exhibits no distension and no mass. There is no tenderness. There is no rebound and no guarding.   Musculoskeletal: Normal range of motion. She exhibits no edema.   Lymphadenopathy:     She has no cervical adenopathy.   Neurological: She is alert and oriented to person, place, and time. She has normal reflexes. She displays normal reflexes. No cranial nerve deficit.   Skin: Skin is warm and dry. No rash noted.   Psychiatric: She has a normal mood and affect. Her behavior is normal. Judgment and thought content normal.   Nursing note and vitals reviewed.        Assessment:       No diagnosis found.    Outpatient Encounter Medications as of 8/16/2018   Medication Sig Dispense Refill    DULoxetine (CYMBALTA) 30 MG capsule   2    nortriptyline (PAMELOR) 50 MG capsule Take 50 mg by mouth every evening.       No facility-administered encounter medications on file as of 8/16/2018.      No orders of the defined types were placed in this encounter.    Plan:      S/P Resection of bronchogenic carcinoma  Will refer to hem/oncology for further care.      patient

## 2023-04-07 NOTE — PLAN OF CARE
Discharge instructions discussed with patient. Pt verbalizes understanding. Consents in chart, vital signs stable, no complaints.     
ID applied and verified. Plan of care initiated with patient. Understanding verbalized.     
Yes